# Patient Record
Sex: FEMALE | Race: WHITE | NOT HISPANIC OR LATINO | URBAN - METROPOLITAN AREA
[De-identification: names, ages, dates, MRNs, and addresses within clinical notes are randomized per-mention and may not be internally consistent; named-entity substitution may affect disease eponyms.]

---

## 2017-10-31 ENCOUNTER — EMERGENCY (EMERGENCY)
Facility: HOSPITAL | Age: 82
LOS: 1 days | Discharge: HOME | End: 2017-10-31
Attending: EMERGENCY MEDICINE | Admitting: EMERGENCY MEDICINE
Payer: MEDICARE

## 2017-10-31 VITALS
DIASTOLIC BLOOD PRESSURE: 58 MMHG | HEART RATE: 83 BPM | RESPIRATION RATE: 16 BRPM | TEMPERATURE: 98 F | SYSTOLIC BLOOD PRESSURE: 154 MMHG | OXYGEN SATURATION: 97 %

## 2017-10-31 VITALS
SYSTOLIC BLOOD PRESSURE: 140 MMHG | TEMPERATURE: 98 F | RESPIRATION RATE: 18 BRPM | HEART RATE: 80 BPM | OXYGEN SATURATION: 99 % | DIASTOLIC BLOOD PRESSURE: 70 MMHG

## 2017-10-31 LAB
ALBUMIN SERPL ELPH-MCNC: 4.3 G/DL — SIGNIFICANT CHANGE UP (ref 3.3–5)
ALP SERPL-CCNC: 110 U/L — SIGNIFICANT CHANGE UP (ref 40–120)
ALT FLD-CCNC: 15 U/L — SIGNIFICANT CHANGE UP (ref 4–33)
APPEARANCE UR: CLEAR — SIGNIFICANT CHANGE UP
AST SERPL-CCNC: 19 U/L — SIGNIFICANT CHANGE UP (ref 4–32)
BACTERIA # UR AUTO: SIGNIFICANT CHANGE UP
BASOPHILS # BLD AUTO: 0.03 K/UL — SIGNIFICANT CHANGE UP (ref 0–0.2)
BASOPHILS NFR BLD AUTO: 0.2 % — SIGNIFICANT CHANGE UP (ref 0–2)
BILIRUB SERPL-MCNC: 2.2 MG/DL — HIGH (ref 0.2–1.2)
BILIRUB UR-MCNC: NEGATIVE — SIGNIFICANT CHANGE UP
BLOOD UR QL VISUAL: NEGATIVE — SIGNIFICANT CHANGE UP
BUN SERPL-MCNC: 13 MG/DL — SIGNIFICANT CHANGE UP (ref 7–23)
CALCIUM SERPL-MCNC: 9.4 MG/DL — SIGNIFICANT CHANGE UP (ref 8.4–10.5)
CHLORIDE SERPL-SCNC: 98 MMOL/L — SIGNIFICANT CHANGE UP (ref 98–107)
CK MB BLD-MCNC: 2.8 NG/ML — SIGNIFICANT CHANGE UP (ref 1–4.7)
CK SERPL-CCNC: 157 U/L — SIGNIFICANT CHANGE UP (ref 25–170)
CO2 SERPL-SCNC: 25 MMOL/L — SIGNIFICANT CHANGE UP (ref 22–31)
COLOR SPEC: YELLOW — SIGNIFICANT CHANGE UP
CREAT SERPL-MCNC: 0.69 MG/DL — SIGNIFICANT CHANGE UP (ref 0.5–1.3)
EOSINOPHIL # BLD AUTO: 0.01 K/UL — SIGNIFICANT CHANGE UP (ref 0–0.5)
EOSINOPHIL NFR BLD AUTO: 0.1 % — SIGNIFICANT CHANGE UP (ref 0–6)
GLUCOSE SERPL-MCNC: 119 MG/DL — HIGH (ref 70–99)
GLUCOSE UR-MCNC: NEGATIVE — SIGNIFICANT CHANGE UP
HCT VFR BLD CALC: 40.5 % — SIGNIFICANT CHANGE UP (ref 34.5–45)
HGB BLD-MCNC: 13 G/DL — SIGNIFICANT CHANGE UP (ref 11.5–15.5)
HYALINE CASTS # UR AUTO: SIGNIFICANT CHANGE UP (ref 0–?)
IMM GRANULOCYTES # BLD AUTO: 0.05 # — SIGNIFICANT CHANGE UP
IMM GRANULOCYTES NFR BLD AUTO: 0.3 % — SIGNIFICANT CHANGE UP (ref 0–1.5)
KETONES UR-MCNC: SIGNIFICANT CHANGE UP
LEUKOCYTE ESTERASE UR-ACNC: SIGNIFICANT CHANGE UP
LYMPHOCYTES # BLD AUTO: 1.81 K/UL — SIGNIFICANT CHANGE UP (ref 1–3.3)
LYMPHOCYTES # BLD AUTO: 11.9 % — LOW (ref 13–44)
MCHC RBC-ENTMCNC: 27.8 PG — SIGNIFICANT CHANGE UP (ref 27–34)
MCHC RBC-ENTMCNC: 32.1 % — SIGNIFICANT CHANGE UP (ref 32–36)
MCV RBC AUTO: 86.7 FL — SIGNIFICANT CHANGE UP (ref 80–100)
MONOCYTES # BLD AUTO: 1.1 K/UL — HIGH (ref 0–0.9)
MONOCYTES NFR BLD AUTO: 7.2 % — SIGNIFICANT CHANGE UP (ref 2–14)
MUCOUS THREADS # UR AUTO: SIGNIFICANT CHANGE UP
NEUTROPHILS # BLD AUTO: 12.18 K/UL — HIGH (ref 1.8–7.4)
NEUTROPHILS NFR BLD AUTO: 80.3 % — HIGH (ref 43–77)
NITRITE UR-MCNC: NEGATIVE — SIGNIFICANT CHANGE UP
NRBC # FLD: 0 — SIGNIFICANT CHANGE UP
PH UR: 7 — SIGNIFICANT CHANGE UP (ref 4.6–8)
PLATELET # BLD AUTO: 412 K/UL — HIGH (ref 150–400)
PMV BLD: 10.2 FL — SIGNIFICANT CHANGE UP (ref 7–13)
POTASSIUM SERPL-MCNC: 4.5 MMOL/L — SIGNIFICANT CHANGE UP (ref 3.5–5.3)
POTASSIUM SERPL-SCNC: 4.5 MMOL/L — SIGNIFICANT CHANGE UP (ref 3.5–5.3)
PROT SERPL-MCNC: 6.9 G/DL — SIGNIFICANT CHANGE UP (ref 6–8.3)
PROT UR-MCNC: NEGATIVE — SIGNIFICANT CHANGE UP
RBC # BLD: 4.67 M/UL — SIGNIFICANT CHANGE UP (ref 3.8–5.2)
RBC # FLD: 15 % — HIGH (ref 10.3–14.5)
RBC CASTS # UR COMP ASSIST: SIGNIFICANT CHANGE UP (ref 0–?)
SODIUM SERPL-SCNC: 138 MMOL/L — SIGNIFICANT CHANGE UP (ref 135–145)
SP GR SPEC: 1.02 — SIGNIFICANT CHANGE UP (ref 1–1.03)
TROPONIN T SERPL-MCNC: < 0.06 NG/ML — SIGNIFICANT CHANGE UP (ref 0–0.06)
TROPONIN T SERPL-MCNC: < 0.06 NG/ML — SIGNIFICANT CHANGE UP (ref 0–0.06)
TSH SERPL-MCNC: 0.82 UIU/ML — SIGNIFICANT CHANGE UP (ref 0.27–4.2)
UROBILINOGEN FLD QL: NORMAL E.U. — SIGNIFICANT CHANGE UP (ref 0.1–0.2)
WBC # BLD: 15.18 K/UL — HIGH (ref 3.8–10.5)
WBC # FLD AUTO: 15.18 K/UL — HIGH (ref 3.8–10.5)
WBC UR QL: SIGNIFICANT CHANGE UP (ref 0–?)

## 2017-10-31 PROCEDURE — 70450 CT HEAD/BRAIN W/O DYE: CPT | Mod: 26

## 2017-10-31 PROCEDURE — 71020: CPT | Mod: 26

## 2017-10-31 PROCEDURE — 72125 CT NECK SPINE W/O DYE: CPT | Mod: 26

## 2017-10-31 PROCEDURE — 99285 EMERGENCY DEPT VISIT HI MDM: CPT | Mod: 25,GC

## 2017-10-31 PROCEDURE — 72170 X-RAY EXAM OF PELVIS: CPT | Mod: 26

## 2017-10-31 PROCEDURE — 12002 RPR S/N/AX/GEN/TRNK2.6-7.5CM: CPT

## 2017-10-31 RX ORDER — SODIUM CHLORIDE 9 MG/ML
500 INJECTION INTRAMUSCULAR; INTRAVENOUS; SUBCUTANEOUS ONCE
Qty: 0 | Refills: 0 | Status: COMPLETED | OUTPATIENT
Start: 2017-10-31 | End: 2017-10-31

## 2017-10-31 RX ADMIN — SODIUM CHLORIDE 500 MILLILITER(S): 9 INJECTION INTRAMUSCULAR; INTRAVENOUS; SUBCUTANEOUS at 19:33

## 2017-10-31 NOTE — ED PROVIDER NOTE - PROGRESS NOTE DETAILS
RHETT Garcia: Pt signed out to me by , waiting on UA results and second set of cardiac enzymes. Rubi: spoke with family extensively and really wants to take pt home and not to admit pt.  straight cath drain 1.2 L dark urine.  ua sent.  ekg pending and trop x 2.  pt with wbc 15. otherwise 1st trop neg.  ct head and cervical neg.  s/o to Dr Whelan to f/u on ua and stool studies- offer admission.  pt had multiple bouts of watery diarrhea. abd exam s/nt/nd.    Dx mechanical fall vs infectious cause of fall. RHETT Garcia: Discussed negative second troponin and UA with pt and family. Discussed sending pt home with a arevalo and urology follow up as over 1L urine was collected following straight cath. Pt and family refuse to have pt go home with arevalo as she had never had urinary issues in the past. Discussed risks of urinary retention including infection, backup into kindeys with resultant decrease in kidney function. Pt and family still refusing to go home with arevalo. RHETT Garcia: Discussed negative second troponin and UA with pt and family. Discussed sending pt home with a arevalo and urology follow up as over 1L urine was collected following straight cath and we have a concern for urinary retention. Pt and family refuse to have pt go home with arevalo as she had never had urinary issues in the past. Discussed risks of urinary retention including infection, backup into kidneys which can result in decreased in kidney function and illness. Pt and family still refusing to go home with Arevalo. They have good PMD follow up with Dr.Norma Suarez

## 2017-10-31 NOTE — ED PROVIDER NOTE - OBJECTIVE STATEMENT
89F with pmhx early dementia, HLD, hypothyroid presents to ED from PMD office, after a reported fall at home last night. Hx obtained from patient, and son/daughter. Patient reportedly fell at home last night when walking to bathroom. Patient denies any dizziness, lightheadedness, chest pain, or shortness of breath preceding the event, but cannot clearly state what caused her to fall. Patient reports hitting her head and cutting the back of her head, denies any LOC but is not 100% certain. Patient reports feeling somewhat off balance after she fell. Patient was helped into bed by , went to bed, and went to see PMD this morning. Patient currently endorses headache but denies any fevers or chills, nausea or vomiting, chest pain, shortness of breath, abdominal pain, diarrhea or constipation, dysuria, hematuria.  Meds include synthroid, lipitor. No allergies.

## 2017-10-31 NOTE — ED PROVIDER NOTE - CARE PLAN
Instructions for follow-up, activity and diet:	Follow up with your primary care provider within 48 hours  Rest and drink plenty of fluids  Apply bacitracin to staples  Return to the emergency department with any worsening or concerning symptoms, another fall, dysuria, distended abdomin, new onset fever or any other concerns. Principal Discharge DX:	Scalp laceration, initial encounter  Instructions for follow-up, activity and diet:	Follow up with your primary care provider within 48 hours  Rest and drink plenty of fluids  Apply bacitracin to staples  Return to the emergency department with any worsening or concerning symptoms, another fall, dysuria, distended abdomin, new onset fever or any other concerns.  Secondary Diagnosis:	Urinary retention

## 2017-10-31 NOTE — ED ADULT NURSE NOTE - OBJECTIVE STATEMENT
Rec'd in IN 2. CC fall with laceration to posterior aspect of head. Endorses complaint of diarrhea. Watery. Pt had several bowel movements. Aox3. No acute distress. 1200 ml output from straight cath. Dr. Rubi aware. Family at bedside.

## 2017-10-31 NOTE — ED ADULT NURSE REASSESSMENT NOTE - NS ED NURSE REASSESS COMMENT FT1
report received at shift change, pt in intake rm 2, AAOx3, VS as noted, pt in NAD, rpt trop drawn and sent, rpt EKG in progress, pt family remains at bedside, pt awaiting lab results. will continue to monitor pt.

## 2017-10-31 NOTE — ED PROVIDER NOTE - PHYSICAL EXAMINATION
wound to back of head, will explore and clean to determine need for closure  normal neuro exam, no deficits noted

## 2017-10-31 NOTE — ED ADULT TRIAGE NOTE - CHIEF COMPLAINT QUOTE
Patient brought to ER from home by EMS. Pt felll last night as per  attempting to walk to BR and sustained a laceration to back of head. Sent to ER by PMD for CT Scan and sutures.

## 2017-10-31 NOTE — ED PROVIDER NOTE - MEDICAL DECISION MAKING DETAILS
89F with pmhx early dementia, HLD, hypothyroid presents to ED from PMD office, after a reported fall at home last night. Presents from PMD. Concern for syncope vs mechanical fall. Will obtain: EKG, CT head/c-spine, CXR, pelvis xray, cbc, cmp, ua, ucx, tsh, enzymes. Will explore head wound and assess need for primary closure. Currently stable.   John Vásquez MD, PGY1

## 2017-10-31 NOTE — ED PROVIDER NOTE - PLAN OF CARE
Follow up with your primary care provider within 48 hours  Rest and drink plenty of fluids  Apply bacitracin to staples  Return to the emergency department with any worsening or concerning symptoms, another fall, dysuria, distended abdomin, new onset fever or any other concerns.

## 2017-10-31 NOTE — ED PROVIDER NOTE - NEUROLOGICAL, MLM
Alert and oriented, no focal deficits noted Alert and oriented, no focal deficits noted, ambulating at baseline

## 2017-11-02 LAB
BACTERIA UR CULT: SIGNIFICANT CHANGE UP
SPECIMEN SOURCE: SIGNIFICANT CHANGE UP

## 2017-11-14 ENCOUNTER — APPOINTMENT (OUTPATIENT)
Dept: GASTROENTEROLOGY | Facility: CLINIC | Age: 82
End: 2017-11-14

## 2017-12-06 ENCOUNTER — APPOINTMENT (OUTPATIENT)
Dept: GASTROENTEROLOGY | Facility: CLINIC | Age: 82
End: 2017-12-06
Payer: MEDICARE

## 2017-12-06 VITALS
DIASTOLIC BLOOD PRESSURE: 80 MMHG | WEIGHT: 123 LBS | HEIGHT: 62 IN | BODY MASS INDEX: 22.63 KG/M2 | SYSTOLIC BLOOD PRESSURE: 120 MMHG | TEMPERATURE: 97.7 F

## 2017-12-06 PROCEDURE — 99214 OFFICE O/P EST MOD 30 MIN: CPT

## 2017-12-06 RX ORDER — LATANOPROST/PF 0.005 %
0.01 DROPS OPHTHALMIC (EYE)
Refills: 0 | Status: ACTIVE | COMMUNITY

## 2017-12-06 RX ORDER — VITS A,C,E/LUTEIN/MINERALS 300MCG-200
TABLET ORAL
Refills: 0 | Status: ACTIVE | COMMUNITY

## 2017-12-08 ENCOUNTER — APPOINTMENT (OUTPATIENT)
Dept: GASTROENTEROLOGY | Facility: CLINIC | Age: 82
End: 2017-12-08

## 2017-12-20 ENCOUNTER — APPOINTMENT (OUTPATIENT)
Dept: GASTROENTEROLOGY | Facility: CLINIC | Age: 82
End: 2017-12-20
Payer: MEDICARE

## 2017-12-20 VITALS
HEIGHT: 62 IN | WEIGHT: 123 LBS | BODY MASS INDEX: 22.63 KG/M2 | SYSTOLIC BLOOD PRESSURE: 130 MMHG | DIASTOLIC BLOOD PRESSURE: 70 MMHG | TEMPERATURE: 97.7 F

## 2017-12-20 DIAGNOSIS — R10.9 UNSPECIFIED ABDOMINAL PAIN: ICD-10-CM

## 2017-12-20 PROCEDURE — 99214 OFFICE O/P EST MOD 30 MIN: CPT

## 2018-01-27 DIAGNOSIS — K59.00 CONSTIPATION, UNSPECIFIED: ICD-10-CM

## 2018-02-20 ENCOUNTER — APPOINTMENT (OUTPATIENT)
Dept: GASTROENTEROLOGY | Facility: CLINIC | Age: 83
End: 2018-02-20

## 2018-03-29 ENCOUNTER — APPOINTMENT (OUTPATIENT)
Dept: GASTROENTEROLOGY | Facility: CLINIC | Age: 83
End: 2018-03-29

## 2018-06-14 ENCOUNTER — RX RENEWAL (OUTPATIENT)
Age: 83
End: 2018-06-14

## 2018-11-15 PROBLEM — E03.9 HYPOTHYROIDISM, UNSPECIFIED: Chronic | Status: ACTIVE | Noted: 2017-10-31

## 2018-12-03 ENCOUNTER — APPOINTMENT (OUTPATIENT)
Dept: GASTROENTEROLOGY | Facility: CLINIC | Age: 83
End: 2018-12-03
Payer: MEDICARE

## 2018-12-03 VITALS
BODY MASS INDEX: 25.76 KG/M2 | HEIGHT: 62 IN | DIASTOLIC BLOOD PRESSURE: 70 MMHG | TEMPERATURE: 98 F | WEIGHT: 140 LBS | HEART RATE: 68 BPM | OXYGEN SATURATION: 98 % | SYSTOLIC BLOOD PRESSURE: 120 MMHG

## 2018-12-03 DIAGNOSIS — Z78.9 OTHER SPECIFIED HEALTH STATUS: ICD-10-CM

## 2018-12-03 DIAGNOSIS — Z86.69 PERSONAL HISTORY OF OTHER DISEASES OF THE NERVOUS SYSTEM AND SENSE ORGANS: ICD-10-CM

## 2018-12-03 DIAGNOSIS — R94.5 ABNORMAL RESULTS OF LIVER FUNCTION STUDIES: ICD-10-CM

## 2018-12-03 PROCEDURE — 99214 OFFICE O/P EST MOD 30 MIN: CPT

## 2018-12-03 RX ORDER — LUBIPROSTONE 24 UG/1
24 CAPSULE, GELATIN COATED ORAL TWICE DAILY
Qty: 180 | Refills: 3 | Status: DISCONTINUED | COMMUNITY
Start: 2017-12-06 | End: 2018-12-03

## 2019-01-17 ENCOUNTER — EMERGENCY (EMERGENCY)
Facility: HOSPITAL | Age: 84
LOS: 0 days | Discharge: HOME | End: 2019-01-17
Attending: EMERGENCY MEDICINE
Payer: MEDICARE

## 2019-01-17 VITALS
RESPIRATION RATE: 17 BRPM | OXYGEN SATURATION: 98 % | TEMPERATURE: 98 F | DIASTOLIC BLOOD PRESSURE: 76 MMHG | SYSTOLIC BLOOD PRESSURE: 142 MMHG | HEART RATE: 81 BPM

## 2019-01-17 VITALS
RESPIRATION RATE: 15 BRPM | OXYGEN SATURATION: 98 % | DIASTOLIC BLOOD PRESSURE: 75 MMHG | TEMPERATURE: 97 F | HEART RATE: 77 BPM | HEIGHT: 62 IN | SYSTOLIC BLOOD PRESSURE: 147 MMHG | WEIGHT: 139.99 LBS

## 2019-01-17 DIAGNOSIS — E78.5 HYPERLIPIDEMIA, UNSPECIFIED: ICD-10-CM

## 2019-01-17 DIAGNOSIS — R40.4 TRANSIENT ALTERATION OF AWARENESS: ICD-10-CM

## 2019-01-17 DIAGNOSIS — Z79.82 LONG TERM (CURRENT) USE OF ASPIRIN: ICD-10-CM

## 2019-01-17 DIAGNOSIS — F03.90 UNSPECIFIED DEMENTIA WITHOUT BEHAVIORAL DISTURBANCE: ICD-10-CM

## 2019-01-17 DIAGNOSIS — E03.9 HYPOTHYROIDISM, UNSPECIFIED: ICD-10-CM

## 2019-01-17 DIAGNOSIS — Z79.899 OTHER LONG TERM (CURRENT) DRUG THERAPY: ICD-10-CM

## 2019-01-17 DIAGNOSIS — R41.82 ALTERED MENTAL STATUS, UNSPECIFIED: ICD-10-CM

## 2019-01-17 DIAGNOSIS — I50.9 HEART FAILURE, UNSPECIFIED: ICD-10-CM

## 2019-01-17 LAB
ALBUMIN SERPL ELPH-MCNC: 3.6 G/DL — SIGNIFICANT CHANGE UP (ref 3.3–5)
ALP SERPL-CCNC: 285 U/L — HIGH (ref 40–120)
ALT FLD-CCNC: 58 U/L — SIGNIFICANT CHANGE UP (ref 12–78)
ANION GAP SERPL CALC-SCNC: 6 MMOL/L — SIGNIFICANT CHANGE UP (ref 5–17)
APPEARANCE UR: CLEAR — SIGNIFICANT CHANGE UP
AST SERPL-CCNC: 41 U/L — HIGH (ref 15–37)
BACTERIA # UR AUTO: ABNORMAL
BASOPHILS # BLD AUTO: 0.05 K/UL — SIGNIFICANT CHANGE UP (ref 0–0.2)
BASOPHILS NFR BLD AUTO: 0.6 % — SIGNIFICANT CHANGE UP (ref 0–2)
BILIRUB SERPL-MCNC: 1.4 MG/DL — HIGH (ref 0.2–1.2)
BILIRUB UR-MCNC: NEGATIVE — SIGNIFICANT CHANGE UP
BUN SERPL-MCNC: 15 MG/DL — SIGNIFICANT CHANGE UP (ref 7–23)
CALCIUM SERPL-MCNC: 9.1 MG/DL — SIGNIFICANT CHANGE UP (ref 8.5–10.1)
CHLORIDE SERPL-SCNC: 107 MMOL/L — SIGNIFICANT CHANGE UP (ref 96–108)
CO2 SERPL-SCNC: 27 MMOL/L — SIGNIFICANT CHANGE UP (ref 22–31)
COLOR SPEC: YELLOW — SIGNIFICANT CHANGE UP
CREAT SERPL-MCNC: 0.78 MG/DL — SIGNIFICANT CHANGE UP (ref 0.5–1.3)
DIFF PNL FLD: NEGATIVE — SIGNIFICANT CHANGE UP
EOSINOPHIL # BLD AUTO: 0.33 K/UL — SIGNIFICANT CHANGE UP (ref 0–0.5)
EOSINOPHIL NFR BLD AUTO: 4.3 % — SIGNIFICANT CHANGE UP (ref 0–6)
GLUCOSE BLDC GLUCOMTR-MCNC: 93 MG/DL — SIGNIFICANT CHANGE UP (ref 70–99)
GLUCOSE SERPL-MCNC: 99 MG/DL — SIGNIFICANT CHANGE UP (ref 70–99)
GLUCOSE UR QL: NEGATIVE MG/DL — SIGNIFICANT CHANGE UP
HCT VFR BLD CALC: 40.5 % — SIGNIFICANT CHANGE UP (ref 34.5–45)
HGB BLD-MCNC: 12.9 G/DL — SIGNIFICANT CHANGE UP (ref 11.5–15.5)
IMM GRANULOCYTES NFR BLD AUTO: 0.1 % — SIGNIFICANT CHANGE UP (ref 0–1.5)
KETONES UR-MCNC: NEGATIVE — SIGNIFICANT CHANGE UP
LEUKOCYTE ESTERASE UR-ACNC: ABNORMAL
LIDOCAIN IGE QN: 96 U/L — SIGNIFICANT CHANGE UP (ref 73–393)
LYMPHOCYTES # BLD AUTO: 2.46 K/UL — SIGNIFICANT CHANGE UP (ref 1–3.3)
LYMPHOCYTES # BLD AUTO: 31.7 % — SIGNIFICANT CHANGE UP (ref 13–44)
MCHC RBC-ENTMCNC: 27.9 PG — SIGNIFICANT CHANGE UP (ref 27–34)
MCHC RBC-ENTMCNC: 31.9 GM/DL — LOW (ref 32–36)
MCV RBC AUTO: 87.5 FL — SIGNIFICANT CHANGE UP (ref 80–100)
MONOCYTES # BLD AUTO: 0.59 K/UL — SIGNIFICANT CHANGE UP (ref 0–0.9)
MONOCYTES NFR BLD AUTO: 7.6 % — SIGNIFICANT CHANGE UP (ref 2–14)
NEUTROPHILS # BLD AUTO: 4.31 K/UL — SIGNIFICANT CHANGE UP (ref 1.8–7.4)
NEUTROPHILS NFR BLD AUTO: 55.7 % — SIGNIFICANT CHANGE UP (ref 43–77)
NITRITE UR-MCNC: NEGATIVE — SIGNIFICANT CHANGE UP
NRBC # BLD: 0 /100 WBCS — SIGNIFICANT CHANGE UP (ref 0–0)
NT-PROBNP SERPL-SCNC: 52 PG/ML — SIGNIFICANT CHANGE UP (ref 0–450)
PH UR: 6 — SIGNIFICANT CHANGE UP (ref 5–8)
PLATELET # BLD AUTO: 467 K/UL — HIGH (ref 150–400)
POTASSIUM SERPL-MCNC: 4 MMOL/L — SIGNIFICANT CHANGE UP (ref 3.5–5.3)
POTASSIUM SERPL-SCNC: 4 MMOL/L — SIGNIFICANT CHANGE UP (ref 3.5–5.3)
PROT SERPL-MCNC: 7.7 GM/DL — SIGNIFICANT CHANGE UP (ref 6–8.3)
PROT UR-MCNC: NEGATIVE MG/DL — SIGNIFICANT CHANGE UP
RBC # BLD: 4.63 M/UL — SIGNIFICANT CHANGE UP (ref 3.8–5.2)
RBC # FLD: 15.7 % — HIGH (ref 10.3–14.5)
SODIUM SERPL-SCNC: 140 MMOL/L — SIGNIFICANT CHANGE UP (ref 135–145)
SP GR SPEC: 1.01 — SIGNIFICANT CHANGE UP (ref 1.01–1.02)
UROBILINOGEN FLD QL: NEGATIVE MG/DL — SIGNIFICANT CHANGE UP
WBC # BLD: 7.75 K/UL — SIGNIFICANT CHANGE UP (ref 3.8–10.5)
WBC # FLD AUTO: 7.75 K/UL — SIGNIFICANT CHANGE UP (ref 3.8–10.5)
WBC UR QL: SIGNIFICANT CHANGE UP

## 2019-01-17 PROCEDURE — 99284 EMERGENCY DEPT VISIT MOD MDM: CPT

## 2019-01-17 PROCEDURE — 93010 ELECTROCARDIOGRAM REPORT: CPT

## 2019-01-17 PROCEDURE — 71045 X-RAY EXAM CHEST 1 VIEW: CPT | Mod: 26

## 2019-01-17 PROCEDURE — 70450 CT HEAD/BRAIN W/O DYE: CPT | Mod: 26

## 2019-01-17 NOTE — ED ADULT TRIAGE NOTE - CHIEF COMPLAINT QUOTE
altered mental status unresponsive family unable to wake her up. Upon EMS arrival pt awake and responsive. Pt feels warm. Pt had breakfast, took a nap that is when family had trouble waking her.

## 2019-01-17 NOTE — ED PROVIDER NOTE - PROGRESS NOTE DETAILS
Pt has been active and alert during ED stay. Labs wnl, has some elevated lft. Pt has been evaluated for this, has fatty liver. Pt eager for discharge with family, will f/u w pmd.

## 2019-01-17 NOTE — ED PROVIDER NOTE - MEDICAL DECISION MAKING DETAILS
Ddx: brief AMS, ro occult infection/ no focal symptoms to suggest tia or CVa  Plan: Cbc, cmp, lipase, ua, cxr, CT head, reassess

## 2019-01-17 NOTE — ED ADULT NURSE NOTE - INTERVENTIONS DEFINITIONS
Stretcher in lowest position, wheels locked, appropriate side rails in place/Instruct patient to call for assistance

## 2019-01-18 LAB
CULTURE RESULTS: NO GROWTH — SIGNIFICANT CHANGE UP
SPECIMEN SOURCE: SIGNIFICANT CHANGE UP

## 2019-04-04 ENCOUNTER — RX RENEWAL (OUTPATIENT)
Age: 84
End: 2019-04-04

## 2019-04-04 RX ORDER — METHYLNALTREXONE BROMIDE 150 MG/1
150 TABLET ORAL
Qty: 90 | Refills: 2 | Status: ACTIVE | COMMUNITY
Start: 2018-03-21 | End: 1900-01-01

## 2019-05-31 ENCOUNTER — APPOINTMENT (OUTPATIENT)
Dept: GASTROENTEROLOGY | Facility: CLINIC | Age: 84
End: 2019-05-31
Payer: MEDICARE

## 2019-05-31 VITALS
DIASTOLIC BLOOD PRESSURE: 75 MMHG | SYSTOLIC BLOOD PRESSURE: 130 MMHG | HEART RATE: 100 BPM | BODY MASS INDEX: 27.23 KG/M2 | RESPIRATION RATE: 16 BRPM | HEIGHT: 62 IN | TEMPERATURE: 97.4 F | OXYGEN SATURATION: 94 % | WEIGHT: 148 LBS

## 2019-05-31 DIAGNOSIS — D64.9 ANEMIA, UNSPECIFIED: ICD-10-CM

## 2019-05-31 DIAGNOSIS — K58.1 IRRITABLE BOWEL SYNDROME WITH CONSTIPATION: ICD-10-CM

## 2019-05-31 PROCEDURE — 99214 OFFICE O/P EST MOD 30 MIN: CPT

## 2019-05-31 RX ORDER — CLONAZEPAM 0.5 MG/1
0.5 TABLET ORAL
Qty: 60 | Refills: 0 | Status: ACTIVE | COMMUNITY
Start: 2019-01-11

## 2019-05-31 RX ORDER — QUETIAPINE FUMARATE 25 MG/1
25 TABLET ORAL
Qty: 120 | Refills: 0 | Status: ACTIVE | COMMUNITY
Start: 2019-05-15

## 2019-05-31 RX ORDER — NITROFURANTOIN (MONOHYDRATE/MACROCRYSTALS) 25; 75 MG/1; MG/1
100 CAPSULE ORAL
Qty: 10 | Refills: 0 | Status: ACTIVE | COMMUNITY
Start: 2019-05-28

## 2019-05-31 RX ORDER — TEMAZEPAM 15 MG/1
15 CAPSULE ORAL
Qty: 30 | Refills: 0 | Status: ACTIVE | COMMUNITY
Start: 2019-05-15

## 2019-05-31 RX ORDER — IRON POLYSACCHARIDE COMPLEX 150 MG
150 CAPSULE ORAL
Qty: 30 | Refills: 3 | Status: ACTIVE | COMMUNITY
Start: 2019-05-31 | End: 1900-01-01

## 2019-05-31 RX ORDER — LACTULOSE 10 G/15ML
10 SOLUTION ORAL
Qty: 473 | Refills: 0 | Status: ACTIVE | COMMUNITY
Start: 2019-04-11

## 2019-05-31 RX ORDER — ZOLPIDEM TARTRATE 5 MG/1
5 TABLET ORAL
Qty: 30 | Refills: 0 | Status: ACTIVE | COMMUNITY
Start: 2019-05-28

## 2019-05-31 NOTE — HISTORY OF PRESENT ILLNESS
[FreeTextEntry1] : Patient is a 89-year-old female with history of chronic constipation. She takes her last her on MiraLax on a p.r.n. basis.\par Her transaminases have improved. Alkaline phosphatase is slightly elevated.\par She was referred for mild anemia and possible iron deficiency. H./H. is 10.7/33.4, MCV 83, ferritin 48% saturation 4.9%, iron 15, TIBC 305.\par No evidence of melena or BRB.

## 2019-05-31 NOTE — ASSESSMENT
[FreeTextEntry1] : Patient with mild anemia which has been stable. Iron studies are borderline for iron deficiency or more likely due to chronic disease. FOBT will be ordered to see if she has any occult GI bleeding. Iron supplements will also be ordered.

## 2019-06-04 ENCOUNTER — APPOINTMENT (OUTPATIENT)
Dept: GASTROENTEROLOGY | Facility: CLINIC | Age: 84
End: 2019-06-04

## 2022-03-30 RX ORDER — CEFUROXIME AXETIL 250 MG
1 TABLET ORAL
Qty: 0 | Refills: 0 | DISCHARGE
Start: 2022-03-30

## 2022-04-01 ENCOUNTER — EMERGENCY (EMERGENCY)
Facility: HOSPITAL | Age: 87
LOS: 0 days | Discharge: ANOTHER HOSPITAL | End: 2022-04-01
Attending: STUDENT IN AN ORGANIZED HEALTH CARE EDUCATION/TRAINING PROGRAM
Payer: MEDICARE

## 2022-04-01 ENCOUNTER — INPATIENT (INPATIENT)
Facility: HOSPITAL | Age: 87
LOS: 5 days | Discharge: HOSPICE | End: 2022-04-07
Attending: HOSPITALIST | Admitting: HOSPITALIST
Payer: MEDICARE

## 2022-04-01 VITALS
TEMPERATURE: 99 F | HEART RATE: 59 BPM | OXYGEN SATURATION: 93 % | RESPIRATION RATE: 16 BRPM | WEIGHT: 130.07 LBS | DIASTOLIC BLOOD PRESSURE: 75 MMHG | HEIGHT: 62 IN | SYSTOLIC BLOOD PRESSURE: 114 MMHG

## 2022-04-01 VITALS
TEMPERATURE: 97 F | RESPIRATION RATE: 16 BRPM | DIASTOLIC BLOOD PRESSURE: 71 MMHG | HEART RATE: 95 BPM | HEIGHT: 62 IN | SYSTOLIC BLOOD PRESSURE: 105 MMHG | OXYGEN SATURATION: 96 %

## 2022-04-01 DIAGNOSIS — R20.9 UNSPECIFIED DISTURBANCES OF SKIN SENSATION: ICD-10-CM

## 2022-04-01 DIAGNOSIS — E03.9 HYPOTHYROIDISM, UNSPECIFIED: ICD-10-CM

## 2022-04-01 DIAGNOSIS — E78.5 HYPERLIPIDEMIA, UNSPECIFIED: ICD-10-CM

## 2022-04-01 DIAGNOSIS — R41.82 ALTERED MENTAL STATUS, UNSPECIFIED: ICD-10-CM

## 2022-04-01 DIAGNOSIS — F03.90 UNSPECIFIED DEMENTIA, UNSPECIFIED SEVERITY, WITHOUT BEHAVIORAL DISTURBANCE, PSYCHOTIC DISTURBANCE, MOOD DISTURBANCE, AND ANXIETY: ICD-10-CM

## 2022-04-01 DIAGNOSIS — F03.90 UNSPECIFIED DEMENTIA WITHOUT BEHAVIORAL DISTURBANCE: ICD-10-CM

## 2022-04-01 DIAGNOSIS — G93.40 ENCEPHALOPATHY, UNSPECIFIED: ICD-10-CM

## 2022-04-01 DIAGNOSIS — I70.222 ATHEROSCLEROSIS OF NATIVE ARTERIES OF EXTREMITIES WITH REST PAIN, LEFT LEG: ICD-10-CM

## 2022-04-01 DIAGNOSIS — Z79.82 LONG TERM (CURRENT) USE OF ASPIRIN: ICD-10-CM

## 2022-04-01 DIAGNOSIS — R19.7 DIARRHEA, UNSPECIFIED: ICD-10-CM

## 2022-04-01 DIAGNOSIS — Z20.822 CONTACT WITH AND (SUSPECTED) EXPOSURE TO COVID-19: ICD-10-CM

## 2022-04-01 DIAGNOSIS — Z29.9 ENCOUNTER FOR PROPHYLACTIC MEASURES, UNSPECIFIED: ICD-10-CM

## 2022-04-01 DIAGNOSIS — N39.0 URINARY TRACT INFECTION, SITE NOT SPECIFIED: ICD-10-CM

## 2022-04-01 DIAGNOSIS — I10 ESSENTIAL (PRIMARY) HYPERTENSION: ICD-10-CM

## 2022-04-01 DIAGNOSIS — M62.82 RHABDOMYOLYSIS: ICD-10-CM

## 2022-04-01 DIAGNOSIS — N17.9 ACUTE KIDNEY FAILURE, UNSPECIFIED: ICD-10-CM

## 2022-04-01 LAB
ALBUMIN SERPL ELPH-MCNC: 2 G/DL — LOW (ref 3.3–5)
ALP SERPL-CCNC: 227 U/L — HIGH (ref 40–120)
ALT FLD-CCNC: 56 U/L — SIGNIFICANT CHANGE UP (ref 12–78)
ANION GAP SERPL CALC-SCNC: 7 MMOL/L — SIGNIFICANT CHANGE UP (ref 5–17)
APTT BLD: 31.3 SEC — SIGNIFICANT CHANGE UP (ref 27.5–35.5)
AST SERPL-CCNC: 122 U/L — HIGH (ref 15–37)
BASOPHILS # BLD AUTO: 0.05 K/UL — SIGNIFICANT CHANGE UP (ref 0–0.2)
BASOPHILS NFR BLD AUTO: 0.3 % — SIGNIFICANT CHANGE UP (ref 0–2)
BILIRUB SERPL-MCNC: 0.8 MG/DL — SIGNIFICANT CHANGE UP (ref 0.2–1.2)
BUN SERPL-MCNC: 54 MG/DL — HIGH (ref 7–23)
CALCIUM SERPL-MCNC: 8 MG/DL — LOW (ref 8.5–10.1)
CHLORIDE SERPL-SCNC: 107 MMOL/L — SIGNIFICANT CHANGE UP (ref 96–108)
CK MB BLD-MCNC: 0.7 % — SIGNIFICANT CHANGE UP (ref 0–3.5)
CK MB CFR SERPL CALC: 28.9 NG/ML — HIGH (ref 0.5–3.6)
CK SERPL-CCNC: 4243 U/L — HIGH (ref 26–192)
CO2 SERPL-SCNC: 28 MMOL/L — SIGNIFICANT CHANGE UP (ref 22–31)
CREAT SERPL-MCNC: 2.34 MG/DL — HIGH (ref 0.5–1.3)
EGFR: 19 ML/MIN/1.73M2 — LOW
EOSINOPHIL # BLD AUTO: 0.01 K/UL — SIGNIFICANT CHANGE UP (ref 0–0.5)
EOSINOPHIL NFR BLD AUTO: 0.1 % — SIGNIFICANT CHANGE UP (ref 0–6)
FLUAV AG NPH QL: SIGNIFICANT CHANGE UP
FLUBV AG NPH QL: SIGNIFICANT CHANGE UP
GLUCOSE SERPL-MCNC: 126 MG/DL — HIGH (ref 70–99)
HCT VFR BLD CALC: 36.8 % — SIGNIFICANT CHANGE UP (ref 34.5–45)
HGB BLD-MCNC: 12.4 G/DL — SIGNIFICANT CHANGE UP (ref 11.5–15.5)
IMM GRANULOCYTES NFR BLD AUTO: 0.8 % — SIGNIFICANT CHANGE UP (ref 0–1.5)
INR BLD: 1.12 RATIO — SIGNIFICANT CHANGE UP (ref 0.88–1.16)
LACTATE SERPL-SCNC: 1.7 MMOL/L — SIGNIFICANT CHANGE UP (ref 0.7–2)
LYMPHOCYTES # BLD AUTO: 0.96 K/UL — LOW (ref 1–3.3)
LYMPHOCYTES # BLD AUTO: 6.1 % — LOW (ref 13–44)
MCHC RBC-ENTMCNC: 28.3 PG — SIGNIFICANT CHANGE UP (ref 27–34)
MCHC RBC-ENTMCNC: 33.7 G/DL — SIGNIFICANT CHANGE UP (ref 32–36)
MCV RBC AUTO: 84 FL — SIGNIFICANT CHANGE UP (ref 80–100)
MONOCYTES # BLD AUTO: 0.97 K/UL — HIGH (ref 0–0.9)
MONOCYTES NFR BLD AUTO: 6.1 % — SIGNIFICANT CHANGE UP (ref 2–14)
NEUTROPHILS # BLD AUTO: 13.67 K/UL — HIGH (ref 1.8–7.4)
NEUTROPHILS NFR BLD AUTO: 86.6 % — HIGH (ref 43–77)
NRBC # BLD: 0 /100 WBCS — SIGNIFICANT CHANGE UP (ref 0–0)
PLATELET # BLD AUTO: 327 K/UL — SIGNIFICANT CHANGE UP (ref 150–400)
POTASSIUM SERPL-MCNC: 4.6 MMOL/L — SIGNIFICANT CHANGE UP (ref 3.5–5.3)
POTASSIUM SERPL-SCNC: 4.6 MMOL/L — SIGNIFICANT CHANGE UP (ref 3.5–5.3)
PROT SERPL-MCNC: 6.8 GM/DL — SIGNIFICANT CHANGE UP (ref 6–8.3)
PROTHROM AB SERPL-ACNC: 13.4 SEC — SIGNIFICANT CHANGE UP (ref 10.5–13.4)
RBC # BLD: 4.38 M/UL — SIGNIFICANT CHANGE UP (ref 3.8–5.2)
RBC # FLD: 15.4 % — HIGH (ref 10.3–14.5)
SARS-COV-2 RNA SPEC QL NAA+PROBE: SIGNIFICANT CHANGE UP
SODIUM SERPL-SCNC: 142 MMOL/L — SIGNIFICANT CHANGE UP (ref 135–145)
TROPONIN I, HIGH SENSITIVITY RESULT: 29.7 NG/L — SIGNIFICANT CHANGE UP
WBC # BLD: 15.78 K/UL — HIGH (ref 3.8–10.5)
WBC # FLD AUTO: 15.78 K/UL — HIGH (ref 3.8–10.5)

## 2022-04-01 PROCEDURE — 70450 CT HEAD/BRAIN W/O DYE: CPT | Mod: 26,MA

## 2022-04-01 PROCEDURE — 99285 EMERGENCY DEPT VISIT HI MDM: CPT

## 2022-04-01 PROCEDURE — 93010 ELECTROCARDIOGRAM REPORT: CPT

## 2022-04-01 PROCEDURE — 74176 CT ABD & PELVIS W/O CONTRAST: CPT | Mod: 26,MA

## 2022-04-01 PROCEDURE — 71045 X-RAY EXAM CHEST 1 VIEW: CPT | Mod: 26

## 2022-04-01 PROCEDURE — 99285 EMERGENCY DEPT VISIT HI MDM: CPT | Mod: GC

## 2022-04-01 PROCEDURE — 93926 LOWER EXTREMITY STUDY: CPT | Mod: 26,LT

## 2022-04-01 PROCEDURE — 93970 EXTREMITY STUDY: CPT | Mod: 26

## 2022-04-01 PROCEDURE — 99222 1ST HOSP IP/OBS MODERATE 55: CPT | Mod: GC

## 2022-04-01 RX ORDER — SODIUM CHLORIDE 9 MG/ML
1000 INJECTION INTRAMUSCULAR; INTRAVENOUS; SUBCUTANEOUS ONCE
Refills: 0 | Status: COMPLETED | OUTPATIENT
Start: 2022-04-01 | End: 2022-04-01

## 2022-04-01 RX ORDER — HEPARIN SODIUM 5000 [USP'U]/ML
4500 INJECTION INTRAVENOUS; SUBCUTANEOUS ONCE
Refills: 0 | Status: DISCONTINUED | OUTPATIENT
Start: 2022-04-01 | End: 2022-04-01

## 2022-04-01 RX ORDER — FUROSEMIDE 40 MG
1 TABLET ORAL
Qty: 0 | Refills: 0 | DISCHARGE

## 2022-04-01 RX ORDER — SODIUM CHLORIDE 9 MG/ML
1000 INJECTION INTRAMUSCULAR; INTRAVENOUS; SUBCUTANEOUS
Refills: 0 | Status: DISCONTINUED | OUTPATIENT
Start: 2022-04-01 | End: 2022-04-02

## 2022-04-01 RX ORDER — CEFTRIAXONE 500 MG/1
1000 INJECTION, POWDER, FOR SOLUTION INTRAMUSCULAR; INTRAVENOUS EVERY 24 HOURS
Refills: 0 | Status: DISCONTINUED | OUTPATIENT
Start: 2022-04-01 | End: 2022-04-02

## 2022-04-01 RX ORDER — METHYLNALTREXONE BROMIDE 12 MG/.6ML
3 INJECTION, SOLUTION SUBCUTANEOUS
Qty: 0 | Refills: 0 | DISCHARGE

## 2022-04-01 RX ORDER — HEPARIN SODIUM 5000 [USP'U]/ML
INJECTION INTRAVENOUS; SUBCUTANEOUS
Qty: 25000 | Refills: 0 | Status: DISCONTINUED | OUTPATIENT
Start: 2022-04-01 | End: 2022-04-01

## 2022-04-01 RX ORDER — PANTOPRAZOLE SODIUM 20 MG/1
40 TABLET, DELAYED RELEASE ORAL DAILY
Refills: 0 | Status: DISCONTINUED | OUTPATIENT
Start: 2022-04-01 | End: 2022-04-07

## 2022-04-01 RX ORDER — LANOLIN ALCOHOL/MO/W.PET/CERES
3 CREAM (GRAM) TOPICAL AT BEDTIME
Refills: 0 | Status: DISCONTINUED | OUTPATIENT
Start: 2022-04-01 | End: 2022-04-01

## 2022-04-01 RX ORDER — TEMAZEPAM 15 MG/1
1 CAPSULE ORAL
Qty: 0 | Refills: 0 | DISCHARGE

## 2022-04-01 RX ORDER — LEVOTHYROXINE SODIUM 125 MCG
1 TABLET ORAL
Qty: 0 | Refills: 0 | DISCHARGE

## 2022-04-01 RX ORDER — ACETAMINOPHEN 500 MG
650 TABLET ORAL EVERY 6 HOURS
Refills: 0 | Status: DISCONTINUED | OUTPATIENT
Start: 2022-04-01 | End: 2022-04-01

## 2022-04-01 RX ORDER — ASPIRIN/CALCIUM CARB/MAGNESIUM 324 MG
1 TABLET ORAL
Qty: 0 | Refills: 0 | DISCHARGE

## 2022-04-01 RX ORDER — HEPARIN SODIUM 5000 [USP'U]/ML
INJECTION INTRAVENOUS; SUBCUTANEOUS
Qty: 25000 | Refills: 0 | Status: DISCONTINUED | OUTPATIENT
Start: 2022-04-01 | End: 2022-04-02

## 2022-04-01 RX ORDER — POLYETHYLENE GLYCOL 3350 17 G/17G
0 POWDER, FOR SOLUTION ORAL
Qty: 0 | Refills: 0 | DISCHARGE

## 2022-04-01 RX ORDER — ONDANSETRON 8 MG/1
4 TABLET, FILM COATED ORAL EVERY 8 HOURS
Refills: 0 | Status: DISCONTINUED | OUTPATIENT
Start: 2022-04-01 | End: 2022-04-07

## 2022-04-01 RX ORDER — HEPARIN SODIUM 5000 [USP'U]/ML
4500 INJECTION INTRAVENOUS; SUBCUTANEOUS EVERY 6 HOURS
Refills: 0 | Status: DISCONTINUED | OUTPATIENT
Start: 2022-04-01 | End: 2022-04-01

## 2022-04-01 RX ORDER — HEPARIN SODIUM 5000 [USP'U]/ML
2000 INJECTION INTRAVENOUS; SUBCUTANEOUS EVERY 6 HOURS
Refills: 0 | Status: DISCONTINUED | OUTPATIENT
Start: 2022-04-01 | End: 2022-04-01

## 2022-04-01 RX ADMIN — SODIUM CHLORIDE 100 MILLILITER(S): 9 INJECTION INTRAMUSCULAR; INTRAVENOUS; SUBCUTANEOUS at 23:59

## 2022-04-01 RX ADMIN — HEPARIN SODIUM 4500 UNIT(S): 5000 INJECTION INTRAVENOUS; SUBCUTANEOUS at 15:03

## 2022-04-01 RX ADMIN — CEFTRIAXONE 100 MILLIGRAM(S): 500 INJECTION, POWDER, FOR SOLUTION INTRAMUSCULAR; INTRAVENOUS at 23:59

## 2022-04-01 RX ADMIN — SODIUM CHLORIDE 1000 MILLILITER(S): 9 INJECTION INTRAMUSCULAR; INTRAVENOUS; SUBCUTANEOUS at 15:12

## 2022-04-01 RX ADMIN — HEPARIN SODIUM 1100 UNIT(S)/HR: 5000 INJECTION INTRAVENOUS; SUBCUTANEOUS at 15:03

## 2022-04-01 RX ADMIN — SODIUM CHLORIDE 1000 MILLILITER(S): 9 INJECTION INTRAMUSCULAR; INTRAVENOUS; SUBCUTANEOUS at 12:53

## 2022-04-01 NOTE — ED PROVIDER NOTE - OBJECTIVE STATEMENT
89 year old female with h/o HLD, thyroid disease and advanced demential presents today biba accompanied with her daughter who states that she has not been herself for the last two days, pts symptoms did start eight days ago, at that time she developed diarrhea which last four days, pt was given Immodium and placed on a diet by her PMD, the diarrhea did resolved, however, now since Monday, urinary odor and discoloration noted, pt was started on cipro on Wednesday for presumed UTI, pt was able to take the pill orally x 1 day, since yesterday pt has had decreased appetite, has had no bowel movement and decreased appetite and lethargic, this morning at 7:30am the pt's aide noticed pt's left lower extremity to be purple in color

## 2022-04-01 NOTE — ED ADULT TRIAGE NOTE - CHIEF COMPLAINT QUOTE
Transfer from [place] for left LE arterial occlusion, foot noted to be discolored and cold to touch, daughter at bedside, hx of dementia, heparin infusion 11units/hr Transfer from [place] for left LE arterial occlusion, foot noted to be discolored and cold to touch, daughter at bedside, hx of dementia, heparin infusing at 11units/hr

## 2022-04-01 NOTE — H&P ADULT - ATTENDING COMMENTS
89F hx of HTN, HLD, Hypothyroidism, dementia presenting from [facility] with acute encephalopathy, found to have acute LLE ischemia, UTI, and rhabdomyolysis. Vascular recs appreciated. C/w broad spectrum abx for now. C/w IVF. DNI/DNR, family wished for palliative consult for further evaluation.

## 2022-04-01 NOTE — H&P ADULT - PROBLEM SELECTOR PLAN 4
likely primarily driven by UTI however will need to monitor for electrolyte disturbances driving acute encephalopathy by rhabdomyolysis from acute limb ischemia  -treat underlying UTI with abx and acute limb ischemia with heparin gtt Due to acute limb ischemia  Uptrending CK   -monitor CK q6h  -monitor bmp q6h  -IVF @100/h  -if worsening renal function in setting of rhabdo will need to consider renal consult if consistent with family wishes. On presentation with 2.34 with prior baseline 0.6-0.8 in 2019  Likely prerenal etiology in setting of poor intake 22 UTI as well as acute limb ischemia causing rhabdo and potential component of post-renal as had been retaining prior to arevalo placement found to have bilateral hydro  -urine lytes and repeat UA with microscopic analysis  -Fluids as above  -Arevalo in palce  -BMP q6h  -CK q6h monitoring for rhabdo  -NS @100 for 24h  -If renal failure continues to worsen will need to contact HCP and inquire if dialysis would be consistent with patient wishes.

## 2022-04-01 NOTE — H&P ADULT - PROBLEM SELECTOR PLAN 6
HOLD home statin. unable to take PO and family does not desire NGT No home anti-hypertensives  continue to monitor VS

## 2022-04-01 NOTE — CONSULT NOTE ADULT - ATTENDING COMMENTS
As above  See my additional input in original consult note
Seen ex'ed w res and fellow in ER ~16:30 and followed until ~ 18:00  Non-communicative, frail.  Hist from daughter.  L LE: Dist ischemia   R LE ok w +DP.      REc:   AC  Skin care/protection    Vasc studies reviewed.    DW'ed daughter and son at length re cond, options, implications, risk to life and limb etc.  Family wishes are for comfort/palliative care only wo any intervention.  Understand implications.    PLease arrange for pall care team eval and mgmt.    REconsult if new vasc concerns arise or if GOC change.

## 2022-04-01 NOTE — CONSULT NOTE ADULT - NSCONSULTADDITIONALINFOA_GEN_ALL_CORE
Fellow Attestation:    89F with L foot color changes since 7am.  New onset.  Per daughter, reportedly ambulatory until 2 weeks ago when suffered from diarrhea and UTI.  Has been immobile since.    No history of Afib or hypercoagulable state.     L foot with purplish discoloration and erythema extending to the knee.  Tender.  Palpable L femoral pulse.  Non palpable pop/pedal pulses.  R foot warm and well perfused with palpable fem/pop/pedal pulses.     Official read of venous and arterial duplex pending.     Strong suspicion of acute limb ischemia.  After thorough discussion with patient son (HCP) and daughter, family wishes to pursue palliative care.    Seen and discussed with attending, Dr. Eli

## 2022-04-01 NOTE — H&P ADULT - PROBLEM SELECTOR PLAN 10
AC heparin gtt  Diet NPO, speech and swallow once safe to swallow  Dispo Pending palliative care consult in AM to guide conversation

## 2022-04-01 NOTE — ED ADULT TRIAGE NOTE - HISTORY OF COVID-19 VACCINATION
[FreeTextEntry3] : I, Dr. Edmar Pagan  have read and attest that all the information, medical decision making and discharge instructions within are true and accurate, I  personally performed the evaluation and management (E/M) services for this new patient.  That E/M includes conducting the initial examination, assessing all conditions, and establishing the plan of care.  Today, my ACP, Emilie Durant PA-C, was here to observe my evaluation and management services for this patient to be followed going forward.\par   Vaccine status unknown

## 2022-04-01 NOTE — CONSULT NOTE ADULT - SUBJECTIVE AND OBJECTIVE BOX
HPI:   89F hx of HTN, HLD, Hyperthyroidism who presents from [facility] with 1 day of progressive L foot redness and now purple discoloration and pain. Pt not responsive at time of evaluation. Per daughter, her brother visited pt this AM and noted left foot mottled (someone visits every day and now noted for the first time). Noted to be in pain and lethargic. She was taken to [facility] VS where no palpable pulses per chart review so patient given heparin bolus -> placed on heparin gtt. There patient Cr 2, cpk 4000s. Trop 20s. Per family, prior to recent UTI pt ambulatory with walker.     In ED pt remains somnolent, HD stable. Pt's HCP expresses that they would prefer minimal intervention at this time and would likely opt for palliative/comfort measures. Family expresses understanding of poor prognosis.       PAST MEDICAL & SURGICAL HISTORY:  Hypothyroid    Dementia    No significant past surgical history        ROS: Negative except for HPI    MEDICATIONS:  Unable to obtain given pt clinical condition     Allergies    No Known Allergies    Intolerances        SOCIAL HISTORY: Unable to obtain given pt mental status     FAMILY HISTORY:  No pertinent hx     Vital Signs Last 24 Hrs  T(C): 36.3 (01 Apr 2022 15:56), Max: 37.2 (01 Apr 2022 12:10)  T(F): 97.4 (01 Apr 2022 15:56), Max: 99 (01 Apr 2022 12:10)  HR: 95 (01 Apr 2022 15:56) (59 - 95)  BP: 105/71 (01 Apr 2022 15:56) (105/71 - 114/75)  BP(mean): --  RR: 16 (01 Apr 2022 15:56) (16 - 16)  SpO2: 96% (01 Apr 2022 15:56) (93% - 96%)    PHYSICAL EXAM:    Constitutional: Somnolent, minimally responsive   HEENT: PERRLA, EOMI  Neck: Supple   Respiratory: Unlabored   Cardiovascular: RRR  Gastrointestinal: soft, NT/ND  Extremities: LLE mottled to mid shin, TTP. Foot warm to touch. No wounds noted. LLE WNL. Unable to obtain motor/sensory exam.   Vascular: RLE 2+ peripheral pulses; LLE 2+fem, no pop/dp/pt   Neurological: A/O x 0      LABS:                        12.4   15.78 )-----------( 327      ( 01 Apr 2022 13:26 )             36.8     04-01    142  |  107  |  54<H>  ----------------------------<  126<H>  4.6   |  28  |  2.34<H>    Ca    8.0<L>      01 Apr 2022 13:26    TPro  6.8  /  Alb  2.0<L>  /  TBili  0.8  /  DBili  x   /  AST  122<H>  /  ALT  56  /  AlkPhos  227<H>  04-01    PT/INR - ( 01 Apr 2022 13:26 )   PT: 13.4 sec;   INR: 1.12 ratio         PTT - ( 01 Apr 2022 13:26 )  PTT:31.3 sec        Assessment and Recommendation:   · Assessment	  89F hx of HTN, HLD, Hyperthyroidism who presents from [facility] with 1 day of progressive L foot redness and now purple discoloration and pain. Pt not responsive at time of evaluation. Per daughter, her brother visited pt this AM and noted left foot mottled (someone visits every day and now noted for the first time). Pt with likely acute limb ischemia.     - HCP understands risks/benefits of possible intervention vs comfort measures  - Family would like to proceed with palliative care  - No surgical intervention at this time  - Care per [facility] discussion   - Discussed with Dr. Sreedhar Raymond PGY3   C Team Surgery   [id number]

## 2022-04-01 NOTE — ED ADULT NURSE NOTE - OBJECTIVE STATEMENT
pt here accompanied by Daughter. as per Daughter, pt has baseline dimentia, left foot discoloration up to half of calf, cold to touch, swelling left calf, diminished pedal pulse on left foot. pt responds to pain only. pt here accompanied by Daughter. as per Daughter, pt has baseline dimentia, left foot discoloration up to half of calf, cold to touch, swelling left calf, diminished pedal pulse on left foot. pt responds to pain only. noted with distended abdomen.

## 2022-04-01 NOTE — ED ADULT TRIAGE NOTE - CHIEF COMPLAINT QUOTE
pt biba form home baseline dementia pt slumped over, warm to touch,  ams, currently being treated  for UTI. as per family new onset left foot discolorations onset yesterday., mental status deteriorating, pt less verbal x 2 days.

## 2022-04-01 NOTE — H&P ADULT - PROBLEM SELECTOR PLAN 3
On presentation with 2.34 with prior baseline 0.6-0.8 in 2019  Likely prerenal etiology in setting of poor intake 22 UTI as well as acute limb ischemia causing rhabdo and potential component of post-renal as had been retaining prior to arevalo placement found to have bilateral hydro  -urine lytes and repeat UA with microscopic analysis  -Fluids as above  -Arevalo in palce  -BMP q12h  -NS @100 for 24h  -If renal failure continues to worsen will need to contact HCP and inquire if dialysis would be consistent with patient wishes. On presentation with 2.34 with prior baseline 0.6-0.8 in 2019  Likely prerenal etiology in setting of poor intake 22 UTI as well as acute limb ischemia causing rhabdo and potential component of post-renal as had been retaining prior to arevalo placement found to have bilateral hydro  -urine lytes and repeat UA with microscopic analysis  -Fluids as above  -Arevalo in palce  -BMP q6h  -CK q6h monitoring for rhabdo  -NS @100 for 24h  -If renal failure continues to worsen will need to contact HCP and inquire if dialysis would be consistent with patient wishes. On presentation to [facility] leukocytosis however afebrile, normocardic, normal respirations   Receiving tx at [facility] VS for UTI  -obtain UA  - Zosyn (4/2 - )  - Vanco (4/2- )  -monitor fever curve, wbc Patient was recent diagnosed with UTI  -C/w zosyn for now  -F/u with culture   -Possibly contributing to encephalopathy as well

## 2022-04-01 NOTE — ED PROVIDER NOTE - PROGRESS NOTE DETAILS
Honorio Gatica D.O., PGY3 (Resident)  Vasc at bedside. Patient with limb ischemia. LLE also edematous, elevated cK from LVS labs. No palpable or dopplerable pulses. Vasc requesting VA duplex arterial. No answer from vasc lab. Message left. Xrays ordered to eval for fx given concern for development compartment syndrome in setting of limb ischemia and likely muscle damage. Dr Eli at bedside w pt and daughter. arevalo placed, dark tea colored urine. labs ordered, IVF, pt went to [facility]. vascular lab will be able to obtain studies. Vascular fellow with pt son and daughter,  family would like to make pt DNR. pt palliative care. no intervention. Jonah Maldonado to admit pt. Dw Dr Maldonado to admit pt. pt care transferred Vascular techMary Carmen, called pt has an extensive arterial occulusion of left leg. pt  was transferred to floor already. contacted medicine to inform of finding.

## 2022-04-01 NOTE — H&P ADULT - ASSESSMENT
89F hx of HTN, HLD, Hypothyroidism, dementia presenting from [facility] with acute limb ischemia and UTI.

## 2022-04-01 NOTE — ED ADULT NURSE NOTE - CHIEF COMPLAINT QUOTE
Transfer from [place] for left LE arterial occlusion, foot noted to be discolored and cold to touch, daughter at bedside, hx of dementia, heparin infusing at 11units/hr

## 2022-04-01 NOTE — ED PROVIDER NOTE - PROGRESS NOTE DETAILS
Dr Rodriguez called, wants surgery PA to evaluate the patient surgery PA called for consult surgery PA called, from their discussion pts daughter requests everything be done for her, Dr Eli from [facility] made aware, transfer center called pt to be transferred to [facility] to dr Eli surgery PA evaluating patient

## 2022-04-01 NOTE — ED ADULT NURSE NOTE - NSIMPLEMENTINTERV_GEN_ALL_ED
Implemented All Fall with Harm Risk Interventions:  [place] to call system. Call bell, personal items and telephone within reach. Instruct patient to call for assistance. Room bathroom lighting operational. Non-slip footwear when patient is off stretcher. Physically safe environment: no spills, clutter or unnecessary equipment. Stretcher in lowest position, wheels locked, appropriate side rails in place. Provide visual cue, wrist band, yellow gown, etc. Monitor gait and stability. Monitor for mental status changes and reorient to person, place, and time. Review medications for side effects contributing to fall risk. Reinforce activity limits and safety measures with patient and family. Provide visual clues: red socks.

## 2022-04-01 NOTE — CONSULT NOTE ADULT - SUBJECTIVE AND OBJECTIVE BOX
Vascular Surgery Consult    HPI: 89 year old female with h/o HLD, thyroid disease and advanced demential presents today sharon accompanied with her daughter who states that she has not been herself for the last two days, pts symptoms did start eight days ago, at that time she developed diarrhea which last four days, pt was given Immodium and placed on a diet by her PMD, the diarrhea did resolved, however, now since Monday, urinary odor and discoloration noted, pt was started on cipro on Wednesday for presumed UTI, pt was able to take the pill orally x 1 day, since yesterday pt has had decreased appetite, has had no bowel movement and decreased appetite and lethargic, this morning at 7:30am the pt's aide noticed pt's left lower extremity to be purple in color with area of redness in mid inner L calf with hardness, daughter denies any trauma to left leg. Pt able to move left foot c/o sever pain and cold.      PAST MEDICAL HISTORY:  Hypothyroid    Dementia        PAST SURGICAL HISTORY:  No significant past surgical history        MEDICATIONS:  heparin   Injectable 4500 Unit(s) IV Push once  heparin   Injectable 4500 Unit(s) IV Push every 6 hours PRN  heparin   Injectable 2000 Unit(s) IV Push every 6 hours PRN  heparin  Infusion.  Unit(s)/Hr IV Continuous <Continuous>      ALLERGIES:  No Known Allergies      VITALS & I/Os:  Vital Signs Last 24 Hrs  T(C): 36.3 (01 Apr 2022 15:56), Max: 37.2 (01 Apr 2022 12:10)  T(F): 97.4 (01 Apr 2022 15:56), Max: 99 (01 Apr 2022 12:10)  HR: 95 (01 Apr 2022 15:56) (59 - 95)  BP: 105/71 (01 Apr 2022 15:56) (105/71 - 114/75)  BP(mean): --  RR: 16 (01 Apr 2022 15:56) (16 - 16)  SpO2: 96% (01 Apr 2022 15:56) (93% - 96%)    I&O's Summary      PHYSICAL EXAM:  General: No acute distress  Respiratory: Nonlabored  Cardiovascular: RRR  Abdominal: Soft, nondistended, nontender. No rebound or guarding. No organomegaly, no palpable mass.  Extremities: Warm    LABS:                        12.4   15.78 )-----------( 327      ( 01 Apr 2022 13:26 )             36.8     04-01    142  |  107  |  54<H>  ----------------------------<  126<H>  4.6   |  28  |  2.34<H>    Ca    8.0<L>      01 Apr 2022 13:26    TPro  6.8  /  Alb  2.0<L>  /  TBili  0.8  /  DBili  x   /  AST  122<H>  /  ALT  56  /  AlkPhos  227<H>  04-01    Lactate: Lactate, Blood: 1.7 mmol/L (04-01 @ 13:26)     PT/INR - ( 01 Apr 2022 13:26 )   PT: 13.4 sec;   INR: 1.12 ratio         PTT - ( 01 Apr 2022 13:26 )  PTT:31.3 sec    CARDIAC MARKERS ( 01 Apr 2022 13:26 )  x     / x     / 4243 U/L / x     / 28.9 ng/mL            IMAGING:                                                                                              Vascular Surgery Consult    HPI: 89 year old female with h/o HLD, thyroid disease and advanced demential presents today sharon accompanied with her daughter who states that she has not been herself for the last two days, pts symptoms did start eight days ago, at that time she developed diarrhea which last four days, pt was given Immodium and placed on a diet by her PMD, the diarrhea did resolved, however, now since Monday, urinary odor and discoloration noted, pt was started on cipro on Wednesday for presumed UTI, pt was able to take the pill orally x 1 day, since yesterday pt has had decreased appetite, has had no bowel movement and decreased appetite and lethargic, this morning at 7:30am the pt's aide noticed pt's left lower extremity to be purple in color with area of redness in mid inner L calf with hardness, daughter denies any trauma to left leg. Pt able to move left foot c/o sever pain and cold. Daughter at bedside states she is agreeable to any interventions to save leg but if she needs an amputation she is also agreeable to that as well. Pt's daughter states that pt was ambulating with walker 2weeks ago.      PAST MEDICAL HISTORY:  Hypothyroid  Dementia    PAST SURGICAL HISTORY:  No significant past surgical history        MEDICATIONS:  heparin   Injectable 4500 Unit(s) IV Push once  heparin   Injectable 4500 Unit(s) IV Push every 6 hours PRN  heparin   Injectable 2000 Unit(s) IV Push every 6 hours PRN  heparin  Infusion.  Unit(s)/Hr IV Continuous <Continuous>      ALLERGIES:  No Known Allergies      VITALS & I/Os:  Vital Signs Last 24 Hrs  T(C): 36.3 (01 Apr 2022 15:56), Max: 37.2 (01 Apr 2022 12:10)  T(F): 97.4 (01 Apr 2022 15:56), Max: 99 (01 Apr 2022 12:10)  HR: 95 (01 Apr 2022 15:56) (59 - 95)  BP: 105/71 (01 Apr 2022 15:56) (105/71 - 114/75)  BP(mean): --  RR: 16 (01 Apr 2022 15:56) (16 - 16)  SpO2: 96% (01 Apr 2022 15:56) (93% - 96%)    I&O's Summary    PE:  GENERAL: elderly female, lying in bed, NAD. On 6L Nc sat 95%. Otherwise Vital signs are within normal limits  EYES: EOMi, sclera anicteric  HENT: NC/AT, slighty dry mucous membranes  NECK: Supple, trachea midline  LUNG: Nonlabored respirations, no wheezes, rales  CV: RRR, Pulses- Radial: 2+ bilateral and equal; No palpable or dopplerable Left pop/dp/pt pulses  ABDOMEN: +distended abdomen,   MSK: + LLE swelling, area of bruising/erythema and firmness mid calf  SKIN: + blanchable purple discoloration to LLE  NEURO: AAO to person (NOT place, time, event), no tremor    LABS:                        12.4   15.78 )-----------( 327      ( 01 Apr 2022 13:26 )             36.8     04-01    142  |  107  |  54<H>  ----------------------------<  126<H>  4.6   |  28  |  2.34<H>    Ca    8.0<L>      01 Apr 2022 13:26    TPro  6.8  /  Alb  2.0<L>  /  TBili  0.8  /  DBili  x   /  AST  122<H>  /  ALT  56  /  AlkPhos  227<H>  04-01    Lactate: Lactate, Blood: 1.7 mmol/L (04-01 @ 13:26)     PT/INR - ( 01 Apr 2022 13:26 )   PT: 13.4 sec;   INR: 1.12 ratio         PTT - ( 01 Apr 2022 13:26 )  PTT:31.3 sec    CARDIAC MARKERS ( 01 Apr 2022 13:26 )  x     / x     / 4243 U/L / x     / 28.9 ng/mL      IMAGING:  < from: CT Abdomen and Pelvis No Cont (04.01.22 @ 14:47) >  FINDINGS:  LOWER CHEST: Trace pericardial effusion. Abnormal opacities are   identified within the right middle lobe and bilateral lower lobes, with   greatest involvement noted within the right lower lobe, likely   representative of a combination of both atelectasis and consolidation. No   pleural effusions identified.    LIVER: Within normal limits.  BILE DUCTS: Normal caliber.  GALLBLADDER: Cholecystectomy.  SPLEEN: Within normal limits.  PANCREAS: Within normal limits.  ADRENALS: Within normal limits.  KIDNEYS/URETERS: Mild bilateral hydronephrosis and hydroureter likely   related to bladder distention. No renal calculi. No space-occupying   lesions.    BLADDER: Markedly distended, likely accounting for abdominal distention.  REPRODUCTIVE ORGANS: Uterus appears unremarkable.    BOWEL: Fecal material scattered throughout the colon. No evidencefor   mechanical bowel obstruction. No colonic wall thickening. Large hiatal   hernia. The appendix is not visualized.  PERITONEUM: No free intraperitoneal air or fluid.  VESSELS: Within normal limits.  RETROPERITONEUM/LYMPH NODES: No lymphadenopathy.  ABDOMINAL WALL: Within normal limits.  BONES: Degenerative changes. Status post left hip arthroplasty.   Intervertebral disc space narrowing L5-S1.    IMPRESSION:  Marked bladder distention with mild bilateral hydronephrosis and   hydroureter. See full discussion above.    < from: CT Head No Cont (04.01.22 @ 14:48) >  FINDINGS:  The ventricles and sulci are prominent, compatible with age-related   generalized cerebral volume loss.   There is no CT evidence for acute   cerebral cortical infarct. There is no evidence of hemorrhage.   No mass   effect is found in the brain.  There is no midline shift or herniation   pattern.      The visualized portions of the paranasal sinuses and mastoid air cells   are clear.    IMPRESSION:    No evidence of acute intracranial abnormality.  No evidence of hemorrhage.

## 2022-04-01 NOTE — ED CLERICAL - NS ED CLERK NOTE PRE-ARRIVAL INFORMATION; ADDITIONAL PRE-ARRIVAL INFORMATION
Left foot cold, purple, decreased pulses.  Sent for vascular - Dr Eli.  Lethargic, decreased appetite, diarrhea, unable to take po.  Recent UTI- took Cipro X 1 day.  Hx thyroid disease, dementia.

## 2022-04-01 NOTE — ED PROVIDER NOTE - CLINICAL SUMMARY MEDICAL DECISION MAKING FREE TEXT BOX
plan reviewed labs from S. ordered xray of left foot. Vascular study. called vascular lab. no answer. Vascular surgery at bedside.

## 2022-04-01 NOTE — CONSULT NOTE ADULT - SUBJECTIVE AND OBJECTIVE BOX
HPI:   89F hx of HTN, HLD, Hyperthyroidism who presents from [facility] with 1 day of progressive L foot redness and now purple discoloration and pain. Pt not responsive at time of evaluation. Per daughter, her brother visited pt this AM and noted left foot mottled (someone visits every day and now noted for the first time). Noted to be in pain and lethargic. She was taken to [facility] VS where no palpable pulses per chart review so patient given heparin bolus -> placed on heparin gtt. There patient Cr 2, cpk 4000s. Trop 20s. Per family, prior to recent UTI pt ambulatory with walker.     In ED pt remains somnolent, HD stable. Pt's HCP expresses that they would prefer minimal intervention at this time and would likely opt for palliative/comfort measures. Family expresses understanding of poor prognosis.       PAST MEDICAL & SURGICAL HISTORY:  Hypothyroid    Dementia    No significant past surgical history        ROS: Negative except for HPI    MEDICATIONS:  Unable to obtain given pt clinical condition     Allergies    No Known Allergies    Intolerances        SOCIAL HISTORY: Unable to obtain given pt mental status     FAMILY HISTORY:  No pertinent hx     Vital Signs Last 24 Hrs  T(C): 36.3 (01 Apr 2022 15:56), Max: 37.2 (01 Apr 2022 12:10)  T(F): 97.4 (01 Apr 2022 15:56), Max: 99 (01 Apr 2022 12:10)  HR: 95 (01 Apr 2022 15:56) (59 - 95)  BP: 105/71 (01 Apr 2022 15:56) (105/71 - 114/75)  BP(mean): --  RR: 16 (01 Apr 2022 15:56) (16 - 16)  SpO2: 96% (01 Apr 2022 15:56) (93% - 96%)    PHYSICAL EXAM:    Constitutional: Somnolent, minimally responsive   HEENT: PERRLA, EOMI  Neck: Supple   Respiratory: Unlabored   Cardiovascular: RRR  Gastrointestinal: soft, NT/ND  Extremities: LLE mottled to mid shin, TTP. Foot warm to touch. No wounds noted. LLE WNL. Unable to obtain motor/sensory exam.   Vascular: RLE 2+ peripheral pulses; LLE 2+fem, no pop/dp/pt   Neurological: A/O x 0      LABS:                        12.4   15.78 )-----------( 327      ( 01 Apr 2022 13:26 )             36.8     04-01    142  |  107  |  54<H>  ----------------------------<  126<H>  4.6   |  28  |  2.34<H>    Ca    8.0<L>      01 Apr 2022 13:26    TPro  6.8  /  Alb  2.0<L>  /  TBili  0.8  /  DBili  x   /  AST  122<H>  /  ALT  56  /  AlkPhos  227<H>  04-01    PT/INR - ( 01 Apr 2022 13:26 )   PT: 13.4 sec;   INR: 1.12 ratio         PTT - ( 01 Apr 2022 13:26 )  PTT:31.3 sec

## 2022-04-01 NOTE — ED ADULT NURSE REASSESSMENT NOTE - NS ED NURSE REASSESS COMMENT FT1
urine specimen collected and sent to lab pt was straight cath, urine specimen collected and sent to lab

## 2022-04-01 NOTE — ED PROVIDER NOTE - NEUROLOGICAL, MLM
unable to assess, pt had h/o advanced dementia, noted to by lethargic at this time, pt does moan w pain with palpation of her LLE

## 2022-04-01 NOTE — H&P ADULT - NSHPPHYSICALEXAM_GEN_ALL_CORE
T(C): 36.2 (04-01-22 @ 21:30), Max: 37.2 (04-01-22 @ 12:10)  HR: 90 (04-01-22 @ 21:30) (59 - 95)  BP: 116/62 (04-01-22 @ 21:30) (105/71 - 116/62)  RR: 17 (04-01-22 @ 21:30) (16 - 17)  SpO2: 95% (04-01-22 @ 21:30) (93% - 96%)    GENERAL: elderly, somnolent  EYES: sclera clear, no exudates  ENMT: Dry lips  NECK: supple, soft, no thyromegaly noted  LUNGS: good air entry bilaterally, clear to auscultation, symmetric breath sounds, no wheezing or rhonchi appreciated  HEART: soft S1/S2, regular rate and rhythm, no murmurs noted, no lower extremity edema  GASTROINTESTINAL: abdomen is soft, nontender, nondistended, normoactive bowel sounds, no palpable masses  INTEGUMENT: mottling of left lower extremity  MUSCULOSKELETAL: cool left lower extremity extending to mid calf with mottling and 0 pulses PT and dorsalis pedis. R PT 2+ and DP 2+  NEUROLOGIC: AOx0. does not rouse to noxious stimuli.   PSYCHIATRIC: unable to assess due to acute metabolic encephalopathy  HEME/LYMPH: no palpable supraclavicular nodules, no obvious ecchymosis or petechiae

## 2022-04-01 NOTE — ED ADULT NURSE NOTE - OBJECTIVE STATEMENT
A&O x0 reporting to ED, transfer from [facility]. Patient c/o LLE for one day with redness, purple discoloration, pain. Pain recently had loss of spouse 8 weeks ago and as per family has had rapid decline. patient moaning in the bed, not participating in interview. Family reports tea-colored urine. Patient arrived to ED on heparin gtt at 1100 units/hr. Patient now DNR/DNI. Has arevalo catheter placed.

## 2022-04-01 NOTE — H&P ADULT - PROBLEM SELECTOR PLAN 5
No home anti-hypertensives  continue to monitor VS likely primarily driven by UTI however will need to monitor for electrolyte disturbances driving acute encephalopathy by rhabdomyolysis from acute limb ischemia  -treat underlying UTI with abx and acute limb ischemia with heparin gtt Due to acute limb ischemia  Uptrending CK   -monitor CK q6h  -monitor bmp q6h  -IVF @100/h  -if worsening renal function in setting of rhabdo will need to consider renal consult if consistent with family wishes.

## 2022-04-01 NOTE — ED PROVIDER NOTE - PHYSICAL EXAMINATION
GENERAL: elderly female, lying in bed, NAD. On 6L Nc sat 95%. Otherwise Vital signs are within normal limits  EYES: Conjunctiva noninjected or pale, sclera anicteric  HENT: NC/AT, slighty dry mucous membranes  NECK: Supple, trachea midline  LUNG: Nonlabored respirations, no wheezes, rales  CV: RRR, Pulses- Radial: 2+ bilateral and equal; No palpable or dopplerable pop/dp/pt pulses  ABDOMEN: +distended abdomen,   MSK: + RLE swelling, firm  SKIN: + blanchable purple discoloration to RLE  NEURO: AAOx0 (NOT to person, place, time, event), no tremor

## 2022-04-01 NOTE — ED PROVIDER NOTE - OBJECTIVE STATEMENT
89F hx of HTN, HLD, Hyperthyroidism who presents from [facility] 2/2 1 day of progressive L foot redness and now purple discoloration and pain. No advanced imaging of LE done there. No palpable pulses per chart review so patient given heparin bolus -> placed on heparin gtt. 89F hx of HTN, HLD, Hyperthyroidism who presents from [facility] 2/2 1 day of progressive L foot redness and now purple discoloration and pain. No advanced imaging of LE done there. No palpable pulses per chart review so patient given heparin bolus -> placed on heparin gtt. Patient Cr 2, cpk 4000s. Trop 20s.

## 2022-04-01 NOTE — H&P ADULT - PROBLEM SELECTOR PLAN 8
AC heparin gtt  Diet NPO, speech and swallow once safe to swallow  Dispo Pending palliative care consult in AM to guide conversation Levothyroxine 50mg and 75mg on alternating days  HOLD po levothyroxine as no NGT desired and cannot safely swallow HOLD home seroquel and gabapentin patient is altered and cannot take po

## 2022-04-01 NOTE — H&P ADULT - NSHPSOCIALHISTORY_GEN_ALL_CORE
Lives with 2 aides  Ambulates with a walker and assistance at baseline  Requires assistance in all ADLs

## 2022-04-01 NOTE — H&P ADULT - PROBLEM SELECTOR PLAN 9
AC heparin gtt  Diet NPO, speech and swallow once safe to swallow  Dispo Pending palliative care consult in AM to guide conversation Levothyroxine 50mg and 75mg on alternating days  HOLD po levothyroxine as no NGT desired and cannot safely swallow

## 2022-04-01 NOTE — H&P ADULT - PROBLEM SELECTOR PLAN 1
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 per goals of care lab draws and fluids are in line with patient wishes  -heparin gtt  -pain management with dilaudid 0.25mg q4h PRN moderate pain and dilaudid 0.5mg q4h PRN for severe pain  -If pain persists or changes in character low threshold to XR tib/fib and ankle.   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 per goals of care lab draws and fluids are in line with patient wishes  -C/w broad spectrum abx-zosyn and vanco by level   -heparin gtt  -pain management with dilaudid 0.25mg q4h PRN moderate pain and dilaudid 0.5mg q4h PRN for severe pain  -If pain persists or changes in character low threshold to XR tib/fib and ankle.   -f/u  vascular surgery recommendations

## 2022-04-01 NOTE — CONSULT NOTE ADULT - ASSESSMENT
89 year old female with h/o HLD, thyroid disease and advanced demential presents today biba accompanied with her daughter with c/o cold LLE, pt found to have no palpable or dopplerable pulses inLLE  as discussed wth Dr. Rodriguez and Dr. Eli pt should be started on Hep drip and transferred emergently to [facility] for possible revascularization if possible.  plan discussed with Pt's daughter and ER attending

## 2022-04-01 NOTE — H&P ADULT - HISTORY OF PRESENT ILLNESS
89F hx of HTN, HLD, Hyperthyroidism who presents from [facility] with 1 day of progressive L foot redness and now purple discoloration and pain. Pt not responsive at time of evaluation. Per daughter, her brother visited pt this AM and noted left foot mottled (someone visits every day and now noted for the first time). Noted to be in pain and lethargic. She was taken to [facility] VS where no palpable pulses per chart review so patient given heparin bolus -> placed on heparin gtt. There patient Cr 2, cpk 4000s. Trop 20s. Per family, prior to recent UTI pt ambulatory with walker.  89F hx of HTN, HLD, Hypothyroidism who presents from [facility] with 1 day of progressive L foot redness and now purple discoloration and pain. Pt not responsive at time of evaluation. Per daughter, her brother visited pt this AM and noted left foot mottled (someone visits every day and now noted for the first time). Noted to be in pain and lethargic. She was taken to [facility] VS where no palpable pulses per chart review so patient given heparin bolus -> placed on heparin gtt. There patient Cr 2, cpk 4000s. Trop 20s. Per family, prior to recent UTI pt ambulatory with walker.     Collateral obtained from HCP  Herve Son- No previous blood clots. Prescribed abx Wednesday 3/30 for UTI. Previous to that time had diarrhea for about one week. Was listless and unresponsive about 1 week ago. Before that time, fluctuating mental status with "bad dementia" but in general with a personality and was able to walk with assistance and a walker and performed ADLs with assistance including performing transfers. Last few days with "challenging" po intake and in last 24h refusing food and drink. Prior to that time would eat. Has two aides at home. One is full time and the other one comes 4 hours a day. States that Wednesday 3/30 the aide was able to have mother singing. Nonverbal past two days. Last dose of abx prior to thursday, patient spit up the medication. Confirmed does not want surgical intervention at this time with acute limb ischemia. Would like antibiotics and IVF but no nasogastric tube or artificial feeding that would artificially prolong her life. States he would like her to "die a natural death" and that decision would be in adherence with patient wishes if she could otherwise make decisions for herself.  89y F pmhx HTN HLD hypothyroidism presenting from [facility] with 1d progression left foot discoloration and pain. Unable to obtain HPI from patient due to acute encephalopathy. While at VS given heparin and 1L of fluids.    Collateral obtained from HCP  Herve Son- No previous blood clots. Prescribed abx Wednesday 3/30 for UTI. Previous to that time had diarrhea for about one week. Was listless and unresponsive about 1 week ago. Before that time, fluctuating mental status with "bad dementia" but in general with a personality and was able to walk with assistance and a walker and performed ADLs with assistance including performing transfers. Last few days with "challenging" po intake and in last 24h refusing food and drink. Prior to that time would eat. Has two aides at home. One is full time and the other one comes 4 hours a day. States that Wednesday 3/30 the aide was able to have mother singing. Nonverbal past two days. Last dose of abx prior to thursday, patient spit up the medication. Confirmed does not want surgical intervention at this time with acute limb ischemia. Would like antibiotics and IVF but no nasogastric tube or artificial feeding that would artificially prolong her life. States he would like her to "die a natural death" and that decision would be in adherence with patient wishes if she could otherwise make decisions for herself.

## 2022-04-01 NOTE — H&P ADULT - PROBLEM SELECTOR PLAN 2
On presentation to [facility] leukocytosis however afebrile, normocardic, normal respirations   Receiving tx at [facility] VS for UTI  -Continue with CTX 1g daily  -monitor fever curve, wbc On presentation to [facility] leukocytosis however afebrile, normocardic, normal respirations   Receiving tx at [facility] VS for UTI  -obtain UA  - CTX 1g daily  -monitor fever curve, wbc likely primarily driven by UTI however will need to monitor for electrolyte disturbances driving acute encephalopathy by rhabdomyolysis from acute limb ischemia  -treat underlying UTI with abx and acute limb ischemia with heparin gtt Most likely due to infectious and metabolic etiology   -C/w broad spectrum abx for limb ischemia/UTI and IVF for rhabdomyolysis   -CT head showed no acute finding   -Less likely due to toxic/medications  -Aspiration and seizure precautions   -DNI/DNR

## 2022-04-01 NOTE — ED PROVIDER NOTE - CLINICAL SUMMARY MEDICAL DECISION MAKING FREE TEXT BOX
pt presented today for altered mental status x 2 days with malodorous and dark colored urine, pt started on oral cipro, tolerated it for one day, no longer taking oral, this morning LLE discoloration noted to her foot which is also cool to touch and poor pulses

## 2022-04-01 NOTE — ED PROVIDER NOTE - ATTENDING CONTRIBUTION TO CARE
Attending Statement: I have personally seen and examined this patient. I have fully participated in the care of this patient. I have reviewed all pertinent clinical information, including history physical exam, plan and the Resident's note and agree except as noted   89yoF hx of HTN, HLD, dementia, hyperthyroidism, transferred from [facility] for left foot redness and discoloration today. PT less awake per daughter, states that she is more confused. No trauma.  Family noted left foot was purple this morning, went to [facility], evaluated by surgery started on heparin gtt.  Vital signs noted. elderly female no resp distress grimace to touch of left foot/calf. left foot is purple, no doppler pulse noted. tender calf.   plan reviewed labs from [facility]. ordered xray of left foot. Vascular study. called vascular lab. no answer. Vascular surgery at bedside.

## 2022-04-02 LAB
ANION GAP SERPL CALC-SCNC: 10 MMOL/L — SIGNIFICANT CHANGE UP (ref 7–14)
ANION GAP SERPL CALC-SCNC: 10 MMOL/L — SIGNIFICANT CHANGE UP (ref 7–14)
ANION GAP SERPL CALC-SCNC: 11 MMOL/L — SIGNIFICANT CHANGE UP (ref 7–14)
APPEARANCE UR: ABNORMAL
APTT BLD: 65.2 SEC — HIGH (ref 27–36.3)
APTT BLD: 68.8 SEC — HIGH (ref 27–36.3)
APTT BLD: 84 SEC — HIGH (ref 27–36.3)
BACTERIA # UR AUTO: ABNORMAL
BILIRUB UR-MCNC: NEGATIVE — SIGNIFICANT CHANGE UP
BUN SERPL-MCNC: 23 MG/DL — SIGNIFICANT CHANGE UP (ref 7–23)
BUN SERPL-MCNC: 28 MG/DL — HIGH (ref 7–23)
BUN SERPL-MCNC: 45 MG/DL — HIGH (ref 7–23)
CALCIUM SERPL-MCNC: 7.1 MG/DL — LOW (ref 8.4–10.5)
CALCIUM SERPL-MCNC: 7.4 MG/DL — LOW (ref 8.4–10.5)
CALCIUM SERPL-MCNC: 8.1 MG/DL — LOW (ref 8.4–10.5)
CHLORIDE SERPL-SCNC: 108 MMOL/L — HIGH (ref 98–107)
CHLORIDE SERPL-SCNC: 112 MMOL/L — HIGH (ref 98–107)
CHLORIDE SERPL-SCNC: 114 MMOL/L — HIGH (ref 98–107)
CK SERPL-CCNC: 4571 U/L — HIGH (ref 25–170)
CK SERPL-CCNC: 5495 U/L — HIGH (ref 25–170)
CO2 SERPL-SCNC: 23 MMOL/L — SIGNIFICANT CHANGE UP (ref 22–31)
CO2 SERPL-SCNC: 25 MMOL/L — SIGNIFICANT CHANGE UP (ref 22–31)
CO2 SERPL-SCNC: 27 MMOL/L — SIGNIFICANT CHANGE UP (ref 22–31)
COLOR SPEC: ABNORMAL
CREAT SERPL-MCNC: 0.97 MG/DL — SIGNIFICANT CHANGE UP (ref 0.5–1.3)
CREAT SERPL-MCNC: 1.05 MG/DL — SIGNIFICANT CHANGE UP (ref 0.5–1.3)
CREAT SERPL-MCNC: 1.62 MG/DL — HIGH (ref 0.5–1.3)
DIFF PNL FLD: ABNORMAL
EGFR: 29 ML/MIN/1.73M2 — LOW
EGFR: 49 ML/MIN/1.73M2 — LOW
EGFR: 54 ML/MIN/1.73M2 — LOW
EPI CELLS # UR: 1 /HPF — SIGNIFICANT CHANGE UP (ref 0–5)
GLUCOSE SERPL-MCNC: 104 MG/DL — HIGH (ref 70–99)
GLUCOSE SERPL-MCNC: 124 MG/DL — HIGH (ref 70–99)
GLUCOSE SERPL-MCNC: 125 MG/DL — HIGH (ref 70–99)
GLUCOSE UR QL: NEGATIVE — SIGNIFICANT CHANGE UP
HCT VFR BLD CALC: 30.8 % — LOW (ref 34.5–45)
HGB BLD-MCNC: 10.1 G/DL — LOW (ref 11.5–15.5)
HYALINE CASTS # UR AUTO: 2 /LPF — SIGNIFICANT CHANGE UP (ref 0–7)
INR BLD: 1.19 RATIO — HIGH (ref 0.88–1.16)
KETONES UR-MCNC: NEGATIVE — SIGNIFICANT CHANGE UP
LEUKOCYTE ESTERASE UR-ACNC: ABNORMAL
MAGNESIUM SERPL-MCNC: 2.2 MG/DL — SIGNIFICANT CHANGE UP (ref 1.6–2.6)
MAGNESIUM SERPL-MCNC: 2.4 MG/DL — SIGNIFICANT CHANGE UP (ref 1.6–2.6)
MAGNESIUM SERPL-MCNC: 2.6 MG/DL — SIGNIFICANT CHANGE UP (ref 1.6–2.6)
MCHC RBC-ENTMCNC: 28.5 PG — SIGNIFICANT CHANGE UP (ref 27–34)
MCHC RBC-ENTMCNC: 32.8 GM/DL — SIGNIFICANT CHANGE UP (ref 32–36)
MCV RBC AUTO: 87 FL — SIGNIFICANT CHANGE UP (ref 80–100)
NITRITE UR-MCNC: POSITIVE
NRBC # BLD: 0 /100 WBCS — SIGNIFICANT CHANGE UP
NRBC # FLD: 0 K/UL — SIGNIFICANT CHANGE UP
PH UR: 6.5 — SIGNIFICANT CHANGE UP (ref 5–8)
PHOSPHATE SERPL-MCNC: 1.9 MG/DL — LOW (ref 2.5–4.5)
PHOSPHATE SERPL-MCNC: 3.3 MG/DL — SIGNIFICANT CHANGE UP (ref 2.5–4.5)
PHOSPHATE SERPL-MCNC: 4.2 MG/DL — SIGNIFICANT CHANGE UP (ref 2.5–4.5)
PLATELET # BLD AUTO: 267 K/UL — SIGNIFICANT CHANGE UP (ref 150–400)
POTASSIUM SERPL-MCNC: 3.5 MMOL/L — SIGNIFICANT CHANGE UP (ref 3.5–5.3)
POTASSIUM SERPL-MCNC: 3.8 MMOL/L — SIGNIFICANT CHANGE UP (ref 3.5–5.3)
POTASSIUM SERPL-MCNC: 4.2 MMOL/L — SIGNIFICANT CHANGE UP (ref 3.5–5.3)
POTASSIUM SERPL-SCNC: 3.5 MMOL/L — SIGNIFICANT CHANGE UP (ref 3.5–5.3)
POTASSIUM SERPL-SCNC: 3.8 MMOL/L — SIGNIFICANT CHANGE UP (ref 3.5–5.3)
POTASSIUM SERPL-SCNC: 4.2 MMOL/L — SIGNIFICANT CHANGE UP (ref 3.5–5.3)
PROT UR-MCNC: ABNORMAL
PROTHROM AB SERPL-ACNC: 13.8 SEC — HIGH (ref 10.5–13.4)
RBC # BLD: 3.54 M/UL — LOW (ref 3.8–5.2)
RBC # FLD: 15.7 % — HIGH (ref 10.3–14.5)
RBC CASTS # UR COMP ASSIST: 11 /HPF — HIGH (ref 0–4)
SODIUM SERPL-SCNC: 145 MMOL/L — SIGNIFICANT CHANGE UP (ref 135–145)
SODIUM SERPL-SCNC: 146 MMOL/L — HIGH (ref 135–145)
SODIUM SERPL-SCNC: 149 MMOL/L — HIGH (ref 135–145)
SP GR SPEC: 1.02 — SIGNIFICANT CHANGE UP (ref 1–1.05)
UROBILINOGEN FLD QL: SIGNIFICANT CHANGE UP
WBC # BLD: 14.15 K/UL — HIGH (ref 3.8–10.5)
WBC # FLD AUTO: 14.15 K/UL — HIGH (ref 3.8–10.5)
WBC UR QL: 270 /HPF — HIGH (ref 0–5)

## 2022-04-02 PROCEDURE — 93010 ELECTROCARDIOGRAM REPORT: CPT

## 2022-04-02 PROCEDURE — 99223 1ST HOSP IP/OBS HIGH 75: CPT

## 2022-04-02 PROCEDURE — 99233 SBSQ HOSP IP/OBS HIGH 50: CPT

## 2022-04-02 RX ORDER — HYDROMORPHONE HYDROCHLORIDE 2 MG/ML
0.25 INJECTION INTRAMUSCULAR; INTRAVENOUS; SUBCUTANEOUS EVERY 4 HOURS
Refills: 0 | Status: DISCONTINUED | OUTPATIENT
Start: 2022-04-02 | End: 2022-04-05

## 2022-04-02 RX ORDER — POTASSIUM PHOSPHATE, MONOBASIC POTASSIUM PHOSPHATE, DIBASIC 236; 224 MG/ML; MG/ML
30 INJECTION, SOLUTION INTRAVENOUS ONCE
Refills: 0 | Status: COMPLETED | OUTPATIENT
Start: 2022-04-02 | End: 2022-04-02

## 2022-04-02 RX ORDER — LEVOTHYROXINE SODIUM 125 MCG
40 TABLET ORAL
Refills: 0 | Status: DISCONTINUED | OUTPATIENT
Start: 2022-04-02 | End: 2022-04-05

## 2022-04-02 RX ORDER — LEVOTHYROXINE SODIUM 125 MCG
60 TABLET ORAL
Refills: 0 | Status: DISCONTINUED | OUTPATIENT
Start: 2022-04-02 | End: 2022-04-05

## 2022-04-02 RX ORDER — HEPARIN SODIUM 5000 [USP'U]/ML
1100 INJECTION INTRAVENOUS; SUBCUTANEOUS
Qty: 25000 | Refills: 0 | Status: DISCONTINUED | OUTPATIENT
Start: 2022-04-02 | End: 2022-04-05

## 2022-04-02 RX ORDER — SODIUM CHLORIDE 9 MG/ML
1000 INJECTION INTRAMUSCULAR; INTRAVENOUS; SUBCUTANEOUS
Refills: 0 | Status: DISCONTINUED | OUTPATIENT
Start: 2022-04-02 | End: 2022-04-02

## 2022-04-02 RX ORDER — VANCOMYCIN HCL 1 G
500 VIAL (EA) INTRAVENOUS ONCE
Refills: 0 | Status: COMPLETED | OUTPATIENT
Start: 2022-04-02 | End: 2022-04-02

## 2022-04-02 RX ORDER — PIPERACILLIN AND TAZOBACTAM 4; .5 G/20ML; G/20ML
3.38 INJECTION, POWDER, LYOPHILIZED, FOR SOLUTION INTRAVENOUS EVERY 12 HOURS
Refills: 0 | Status: DISCONTINUED | OUTPATIENT
Start: 2022-04-02 | End: 2022-04-05

## 2022-04-02 RX ORDER — HYDROMORPHONE HYDROCHLORIDE 2 MG/ML
0.5 INJECTION INTRAMUSCULAR; INTRAVENOUS; SUBCUTANEOUS EVERY 4 HOURS
Refills: 0 | Status: DISCONTINUED | OUTPATIENT
Start: 2022-04-02 | End: 2022-04-05

## 2022-04-02 RX ORDER — SODIUM CHLORIDE 9 MG/ML
1000 INJECTION, SOLUTION INTRAVENOUS
Refills: 0 | Status: DISCONTINUED | OUTPATIENT
Start: 2022-04-02 | End: 2022-04-04

## 2022-04-02 RX ORDER — ACETAMINOPHEN 500 MG
650 TABLET ORAL ONCE
Refills: 0 | Status: COMPLETED | OUTPATIENT
Start: 2022-04-02 | End: 2022-04-02

## 2022-04-02 RX ADMIN — PIPERACILLIN AND TAZOBACTAM 25 GRAM(S): 4; .5 INJECTION, POWDER, LYOPHILIZED, FOR SOLUTION INTRAVENOUS at 05:53

## 2022-04-02 RX ADMIN — HEPARIN SODIUM 1100 UNIT(S)/HR: 5000 INJECTION INTRAVENOUS; SUBCUTANEOUS at 09:22

## 2022-04-02 RX ADMIN — PANTOPRAZOLE SODIUM 40 MILLIGRAM(S): 20 TABLET, DELAYED RELEASE ORAL at 11:35

## 2022-04-02 RX ADMIN — SODIUM CHLORIDE 75 MILLILITER(S): 9 INJECTION, SOLUTION INTRAVENOUS at 17:22

## 2022-04-02 RX ADMIN — SODIUM CHLORIDE 100 MILLILITER(S): 9 INJECTION INTRAMUSCULAR; INTRAVENOUS; SUBCUTANEOUS at 10:28

## 2022-04-02 RX ADMIN — PIPERACILLIN AND TAZOBACTAM 25 GRAM(S): 4; .5 INJECTION, POWDER, LYOPHILIZED, FOR SOLUTION INTRAVENOUS at 17:22

## 2022-04-02 RX ADMIN — POTASSIUM PHOSPHATE, MONOBASIC POTASSIUM PHOSPHATE, DIBASIC 83.33 MILLIMOLE(S): 236; 224 INJECTION, SOLUTION INTRAVENOUS at 18:15

## 2022-04-02 RX ADMIN — Medication 650 MILLIGRAM(S): at 18:15

## 2022-04-02 RX ADMIN — Medication 100 MILLIGRAM(S): at 02:43

## 2022-04-02 RX ADMIN — HEPARIN SODIUM 1100 UNIT(S)/HR: 5000 INJECTION INTRAVENOUS; SUBCUTANEOUS at 01:28

## 2022-04-02 RX ADMIN — HEPARIN SODIUM 1100 UNIT(S)/HR: 5000 INJECTION INTRAVENOUS; SUBCUTANEOUS at 15:57

## 2022-04-02 RX ADMIN — Medication 40 MICROGRAM(S): at 06:34

## 2022-04-02 RX ADMIN — HEPARIN SODIUM 1100 UNIT(S)/HR: 5000 INJECTION INTRAVENOUS; SUBCUTANEOUS at 19:18

## 2022-04-02 NOTE — PATIENT PROFILE ADULT - NSPRESCRUSEDDRG_GEN_A_NUR
FISH Detail Level: Simple Additional Notes: Patient consent was obtained to proceed with the visit and recommended plan of care after discussion of all risks and benefits, including the risks of COVID-19 exposure.

## 2022-04-02 NOTE — SWALLOW BEDSIDE ASSESSMENT ADULT - ADDITIONAL RECOMMENDATIONS
Reconsult this service as patient becomes medically optimized to reassess. This service will follow as schedule permits.

## 2022-04-02 NOTE — SWALLOW BEDSIDE ASSESSMENT ADULT - SWALLOW EVAL: DIAGNOSIS
1. The patient presents with a mild-moderate oral dysphagia for puree and moderately thick liquids marked by reduced stripping of bolus from utensil, delayed bolus collection, transfer and posterior transport. 2. Mild pharyngeal dysphagia for puree and moderately thick liquid textures marked by suspected delayed initiation of pharyngeal swallow trigger with + hyolaryngeal elevation upon digital palpation. Additional consistencies not administered 2/2 patient's decreased participation/confusion. 3. Recommend dietary consult to ensure patient is adequately meeting daily caloric needs.

## 2022-04-02 NOTE — SWALLOW BEDSIDE ASSESSMENT ADULT - COMMENTS
Per charting, the patient is a "89F hx of HTN, HLD, Hypothyroidism, dementia presenting from [facility] with acute limb ischemia and UTI."    CT HEAD 4/1/22: "No evidence of acute intracranial abnormality.  No evidence of hemorrhage."    XR CHEST 4/1/22: "Patchy infiltrate/atelectasis right base. Correlate for infection. Large hiatal hernia."    Consult received and chart reviewed. The patient was seen this afternoon for an assessment of swallow function, at which time she was awake with eyes closed, utilizing jargon. The patient did not follow directives or answer yes/no questions. She required maximal encouragement to participate in evaluation 2/2 confusion. Patient vocalizing throughout evaluation.

## 2022-04-02 NOTE — CONSULT NOTE ADULT - SUBJECTIVE AND OBJECTIVE BOX
CHIEF COMPLAINT: leg pain    HISTORY OF PRESENT ILLNESS:  89y F pmhx HTN HLD hypothyroidism presenting from [facility] with 1d progression left foot discoloration and pain. Unable to obtain HPI from patient due to acute encephalopathy. While at [facility] given heparin and 1L of fluids.    Collateral obtained from chart and H&P- No previous blood clots. Prescribed abx Wednesday 3/30 for UTI. Previous to that time had diarrhea for about one week. Was listless and unresponsive about 1 week ago. Before that time, fluctuating mental status with "bad dementia" but in general with a personality and was able to walk with assistance and a walker and performed ADLs with assistance including performing transfers. Last few days with "challenging" po intake and in last 24h refusing food and drink. Prior to that time would eat. Has two aides at home. One is full time and the other one comes 4 hours a day. States that Wednesday 3/30 the aide was able to have mother singing. Nonverbal past two days. Last dose of abx prior to thursday, patient spit up the medication. Confirmed does not want surgical intervention at this time with acute limb ischemia. Would like antibiotics and IVF but no nasogastric tube or artificial feeding that would artificially prolong her life. States he would like her to "die a natural death" and that decision would be in adherence with patient wishes if she could otherwise make decisions for herself.     Allergies    No Known Allergies    Intolerances    	    MEDICATIONS:  heparin  Infusion. 1100 Unit(s)/Hr IV Continuous <Continuous>    piperacillin/tazobactam IVPB.. 3.375 Gram(s) IV Intermittent every 12 hours      HYDROmorphone  Injectable 0.25 milliGRAM(s) IV Push every 4 hours PRN  HYDROmorphone  Injectable 0.5 milliGRAM(s) IV Push every 4 hours PRN  ondansetron Injectable 4 milliGRAM(s) IV Push every 8 hours PRN    pantoprazole  Injectable 40 milliGRAM(s) IV Push daily    levothyroxine Injectable 40 MICROGram(s) IV Push <User Schedule>  levothyroxine Injectable 60 MICROGram(s) IV Push <User Schedule>    sodium chloride 0.9%. 1000 milliLiter(s) IV Continuous <Continuous>      PAST MEDICAL & SURGICAL HISTORY:  Hypothyroid    Dementia    Hypercholesterolemia    No significant past surgical history        FAMILY HISTORY:  No pertinent family history in first degree relatives        SOCIAL HISTORY:    non smoker. dependent on adls    REVIEW OF SYSTEMS:  See HPI, otherwise complete 10 point review of systems negative      [ ] Unable to obtain    PHYSICAL EXAM:  T(C): 36.5 (04-02-22 @ 05:05), Max: 37.2 (04-01-22 @ 12:10)  HR: 92 (04-02-22 @ 05:05) (59 - 95)  BP: 117/66 (04-02-22 @ 05:05) (105/71 - 117/66)  RR: 18 (04-02-22 @ 05:05) (16 - 18)  SpO2: 98% (04-02-22 @ 05:05) (93% - 98%)  Wt(kg): --  I&O's Summary    01 Apr 2022 07:01  -  02 Apr 2022 07:00  --------------------------------------------------------  IN: 0 mL / OUT: 2900 mL / NET: -2900 mL        Appearance: No Acute Distress	  HEENT:  Normal oral mucosa, PERRL, EOMI	  Cardiovascular: Normal S1 S2, 2/6 sanchez  Respiratory: Lungs clear to auscultation bilaterally  Gastrointestinal:  Soft, Non-tender, + BS	  Skin: No rashes, No ecchymoses, No cyanosis	  Neurologic: Non-focal  Extremities: No clubbing, cyanosis or edema  Vascular: dec pulses in le  Psychiatry: A & O x 3, Mood & affect appropriate    Laboratory Data:	 	    CBC Full  -  ( 02 Apr 2022 08:50 )  WBC Count : 14.15 K/uL  Hemoglobin : 10.1 g/dL  Hematocrit : 30.8 %  Platelet Count - Automated : 267 K/uL  Mean Cell Volume : 87.0 fL  Mean Cell Hemoglobin : 28.5 pg  Mean Cell Hemoglobin Concentration : 32.8 gm/dL  Auto Neutrophil # : x  Auto Lymphocyte # : x  Auto Monocyte # : x  Auto Eosinophil # : x  Auto Basophil # : x  Auto Neutrophil % : x  Auto Lymphocyte % : x  Auto Monocyte % : x  Auto Eosinophil % : x  Auto Basophil % : x    04-01    145  |  108<H>  |  45<H>  ----------------------------<  124<H>  4.2   |  27  |  1.62<H>  04-01    142  |  107  |  54<H>  ----------------------------<  126<H>  4.6   |  28  |  2.34<H>    Ca    8.1<L>      01 Apr 2022 23:49  Ca    8.0<L>      01 Apr 2022 13:26  Phos  3.3     04-01  Mg     2.60     04-01    TPro  6.8  /  Alb  2.0<L>  /  TBili  0.8  /  DBili  x   /  AST  122<H>  /  ALT  56  /  AlkPhos  227<H>  04-01      proBNP:   Lipid Profile:   HgA1c:   TSH:       CARDIAC MARKERS:            Interpretation of Telemetry: 	    ECG:  	  RADIOLOGY:  OTHER: 	    PREVIOUS DIAGNOSTIC TESTING:    [ ] Echocardiogram:  [ ] Catheterization:  [ ] Stress Test:  	    Assessment:  acute limb ischemia  UTI  encephalopathy  EMPERATRIZ  HLD  dementia  hypothyroid    Recs:  cardiac status stable  no e/o chf or cardiac ischemia  IV fluids to maintain euvolemic  abx per primary team  f/u vasc recs. likely conservative management for now  palliative evaluation  arevalo in place for bladder decompression  pain control  dvt ppx            Greater than 60 minutes spent on total encounter; more than 50% of the visit was spent counseling and/or coordinating care by the attending physician.   	  Fabiano Joyce MD   Cardiovascular Diseases  [phone]

## 2022-04-02 NOTE — PATIENT PROFILE ADULT - FALL HARM RISK - HARM RISK INTERVENTIONS
Assistance with ambulation/Assistance OOB with selected safe patient handling equipment/Communicate Risk of Fall with Harm to all staff/Discuss with provider need for PT consult/Monitor gait and stability/Reinforce activity limits and safety measures with patient and family/Tailored Fall Risk Interventions/Visual Cue: Yellow wristband and red socks/Bed in lowest position, wheels locked, appropriate side rails in place/Call bell, personal items and telephone in reach/Instruct patient to call for assistance before getting out of bed or chair/Non-slip footwear when patient is out of bed/[place] to call system/Physically safe environment - no spills, clutter or unnecessary equipment/Purposeful Proactive Rounding/Room/bathroom lighting operational, light cord in reach

## 2022-04-03 LAB
ANION GAP SERPL CALC-SCNC: 12 MMOL/L — SIGNIFICANT CHANGE UP (ref 7–14)
ANION GAP SERPL CALC-SCNC: 15 MMOL/L — HIGH (ref 7–14)
APTT BLD: 43.4 SEC — HIGH (ref 27–36.3)
APTT BLD: 56.7 SEC — HIGH (ref 27–36.3)
APTT BLD: 59.3 SEC — HIGH (ref 27–36.3)
BASOPHILS # BLD AUTO: 0.04 K/UL — SIGNIFICANT CHANGE UP (ref 0–0.2)
BASOPHILS NFR BLD AUTO: 0.2 % — SIGNIFICANT CHANGE UP (ref 0–2)
BUN SERPL-MCNC: 16 MG/DL — SIGNIFICANT CHANGE UP (ref 7–23)
BUN SERPL-MCNC: 21 MG/DL — SIGNIFICANT CHANGE UP (ref 7–23)
CALCIUM SERPL-MCNC: 7.3 MG/DL — LOW (ref 8.4–10.5)
CALCIUM SERPL-MCNC: 7.7 MG/DL — LOW (ref 8.4–10.5)
CHLORIDE SERPL-SCNC: 108 MMOL/L — HIGH (ref 98–107)
CHLORIDE SERPL-SCNC: 110 MMOL/L — HIGH (ref 98–107)
CK SERPL-CCNC: 4425 U/L — HIGH (ref 25–170)
CO2 SERPL-SCNC: 21 MMOL/L — LOW (ref 22–31)
CO2 SERPL-SCNC: 23 MMOL/L — SIGNIFICANT CHANGE UP (ref 22–31)
CREAT SERPL-MCNC: 0.7 MG/DL — SIGNIFICANT CHANGE UP (ref 0.5–1.3)
CREAT SERPL-MCNC: 0.86 MG/DL — SIGNIFICANT CHANGE UP (ref 0.5–1.3)
CULTURE RESULTS: SIGNIFICANT CHANGE UP
EGFR: 62 ML/MIN/1.73M2 — SIGNIFICANT CHANGE UP
EGFR: 80 ML/MIN/1.73M2 — SIGNIFICANT CHANGE UP
EOSINOPHIL # BLD AUTO: 0.23 K/UL — SIGNIFICANT CHANGE UP (ref 0–0.5)
EOSINOPHIL NFR BLD AUTO: 1.3 % — SIGNIFICANT CHANGE UP (ref 0–6)
GLUCOSE SERPL-MCNC: 104 MG/DL — HIGH (ref 70–99)
GLUCOSE SERPL-MCNC: 152 MG/DL — HIGH (ref 70–99)
HCT VFR BLD CALC: 31.5 % — LOW (ref 34.5–45)
HGB BLD-MCNC: 10.1 G/DL — LOW (ref 11.5–15.5)
IANC: 13.93 K/UL — HIGH (ref 1.8–7.4)
IMM GRANULOCYTES NFR BLD AUTO: 3.1 % — HIGH (ref 0–1.5)
INR BLD: 1.3 RATIO — HIGH (ref 0.88–1.16)
LYMPHOCYTES # BLD AUTO: 1.37 K/UL — SIGNIFICANT CHANGE UP (ref 1–3.3)
LYMPHOCYTES # BLD AUTO: 7.9 % — LOW (ref 13–44)
MAGNESIUM SERPL-MCNC: 2 MG/DL — SIGNIFICANT CHANGE UP (ref 1.6–2.6)
MAGNESIUM SERPL-MCNC: 2.2 MG/DL — SIGNIFICANT CHANGE UP (ref 1.6–2.6)
MCHC RBC-ENTMCNC: 27.7 PG — SIGNIFICANT CHANGE UP (ref 27–34)
MCHC RBC-ENTMCNC: 32.1 GM/DL — SIGNIFICANT CHANGE UP (ref 32–36)
MCV RBC AUTO: 86.5 FL — SIGNIFICANT CHANGE UP (ref 80–100)
MONOCYTES # BLD AUTO: 1.2 K/UL — HIGH (ref 0–0.9)
MONOCYTES NFR BLD AUTO: 6.9 % — SIGNIFICANT CHANGE UP (ref 2–14)
NEUTROPHILS # BLD AUTO: 13.93 K/UL — HIGH (ref 1.8–7.4)
NEUTROPHILS NFR BLD AUTO: 80.6 % — HIGH (ref 43–77)
NRBC # BLD: 0 /100 WBCS — SIGNIFICANT CHANGE UP
NRBC # FLD: 0 K/UL — SIGNIFICANT CHANGE UP
PHOSPHATE SERPL-MCNC: 2.1 MG/DL — LOW (ref 2.5–4.5)
PHOSPHATE SERPL-MCNC: 2.9 MG/DL — SIGNIFICANT CHANGE UP (ref 2.5–4.5)
PLATELET # BLD AUTO: 344 K/UL — SIGNIFICANT CHANGE UP (ref 150–400)
POTASSIUM SERPL-MCNC: 3.7 MMOL/L — SIGNIFICANT CHANGE UP (ref 3.5–5.3)
POTASSIUM SERPL-MCNC: 3.8 MMOL/L — SIGNIFICANT CHANGE UP (ref 3.5–5.3)
POTASSIUM SERPL-SCNC: 3.7 MMOL/L — SIGNIFICANT CHANGE UP (ref 3.5–5.3)
POTASSIUM SERPL-SCNC: 3.8 MMOL/L — SIGNIFICANT CHANGE UP (ref 3.5–5.3)
PROTHROM AB SERPL-ACNC: 15.1 SEC — HIGH (ref 10.5–13.4)
RBC # BLD: 3.64 M/UL — LOW (ref 3.8–5.2)
RBC # FLD: 15.8 % — HIGH (ref 10.3–14.5)
SODIUM SERPL-SCNC: 144 MMOL/L — SIGNIFICANT CHANGE UP (ref 135–145)
SODIUM SERPL-SCNC: 145 MMOL/L — SIGNIFICANT CHANGE UP (ref 135–145)
SPECIMEN SOURCE: SIGNIFICANT CHANGE UP
VANCOMYCIN FLD-MCNC: <4 UG/ML — SIGNIFICANT CHANGE UP
WBC # BLD: 17.3 K/UL — HIGH (ref 3.8–10.5)
WBC # FLD AUTO: 17.3 K/UL — HIGH (ref 3.8–10.5)

## 2022-04-03 PROCEDURE — 99233 SBSQ HOSP IP/OBS HIGH 50: CPT

## 2022-04-03 RX ORDER — HEPARIN SODIUM 5000 [USP'U]/ML
4000 INJECTION INTRAVENOUS; SUBCUTANEOUS EVERY 6 HOURS
Refills: 0 | Status: DISCONTINUED | OUTPATIENT
Start: 2022-04-03 | End: 2022-04-05

## 2022-04-03 RX ORDER — HEPARIN SODIUM 5000 [USP'U]/ML
2000 INJECTION INTRAVENOUS; SUBCUTANEOUS EVERY 6 HOURS
Refills: 0 | Status: DISCONTINUED | OUTPATIENT
Start: 2022-04-03 | End: 2022-04-05

## 2022-04-03 RX ORDER — MAGNESIUM HYDROXIDE 400 MG/1
30 TABLET, CHEWABLE ORAL ONCE
Refills: 0 | Status: COMPLETED | OUTPATIENT
Start: 2022-04-03 | End: 2022-04-03

## 2022-04-03 RX ORDER — VANCOMYCIN HCL 1 G
750 VIAL (EA) INTRAVENOUS ONCE
Refills: 0 | Status: COMPLETED | OUTPATIENT
Start: 2022-04-03 | End: 2022-04-03

## 2022-04-03 RX ORDER — SODIUM CHLORIDE 9 MG/ML
1000 INJECTION, SOLUTION INTRAVENOUS
Refills: 0 | Status: DISCONTINUED | OUTPATIENT
Start: 2022-04-03 | End: 2022-04-04

## 2022-04-03 RX ORDER — SENNA PLUS 8.6 MG/1
2 TABLET ORAL AT BEDTIME
Refills: 0 | Status: DISCONTINUED | OUTPATIENT
Start: 2022-04-03 | End: 2022-04-04

## 2022-04-03 RX ORDER — POLYETHYLENE GLYCOL 3350 17 G/17G
17 POWDER, FOR SOLUTION ORAL DAILY
Refills: 0 | Status: DISCONTINUED | OUTPATIENT
Start: 2022-04-03 | End: 2022-04-04

## 2022-04-03 RX ADMIN — Medication 250 MILLIGRAM(S): at 08:54

## 2022-04-03 RX ADMIN — HEPARIN SODIUM 1200 UNIT(S)/HR: 5000 INJECTION INTRAVENOUS; SUBCUTANEOUS at 07:32

## 2022-04-03 RX ADMIN — PIPERACILLIN AND TAZOBACTAM 25 GRAM(S): 4; .5 INJECTION, POWDER, LYOPHILIZED, FOR SOLUTION INTRAVENOUS at 05:09

## 2022-04-03 RX ADMIN — Medication 40 MICROGRAM(S): at 05:10

## 2022-04-03 RX ADMIN — MAGNESIUM HYDROXIDE 30 MILLILITER(S): 400 TABLET, CHEWABLE ORAL at 13:28

## 2022-04-03 RX ADMIN — HEPARIN SODIUM 1100 UNIT(S)/HR: 5000 INJECTION INTRAVENOUS; SUBCUTANEOUS at 00:42

## 2022-04-03 RX ADMIN — HEPARIN SODIUM 1200 UNIT(S)/HR: 5000 INJECTION INTRAVENOUS; SUBCUTANEOUS at 15:06

## 2022-04-03 RX ADMIN — HEPARIN SODIUM 2000 UNIT(S): 5000 INJECTION INTRAVENOUS; SUBCUTANEOUS at 08:55

## 2022-04-03 RX ADMIN — HEPARIN SODIUM 1300 UNIT(S)/HR: 5000 INJECTION INTRAVENOUS; SUBCUTANEOUS at 22:16

## 2022-04-03 RX ADMIN — SODIUM CHLORIDE 75 MILLILITER(S): 9 INJECTION, SOLUTION INTRAVENOUS at 21:25

## 2022-04-03 RX ADMIN — PIPERACILLIN AND TAZOBACTAM 25 GRAM(S): 4; .5 INJECTION, POWDER, LYOPHILIZED, FOR SOLUTION INTRAVENOUS at 18:32

## 2022-04-03 RX ADMIN — SENNA PLUS 2 TABLET(S): 8.6 TABLET ORAL at 21:25

## 2022-04-03 RX ADMIN — HEPARIN SODIUM 1200 UNIT(S)/HR: 5000 INJECTION INTRAVENOUS; SUBCUTANEOUS at 19:32

## 2022-04-03 RX ADMIN — SODIUM CHLORIDE 75 MILLILITER(S): 9 INJECTION, SOLUTION INTRAVENOUS at 08:55

## 2022-04-03 RX ADMIN — POLYETHYLENE GLYCOL 3350 17 GRAM(S): 17 POWDER, FOR SOLUTION ORAL at 13:28

## 2022-04-03 RX ADMIN — PANTOPRAZOLE SODIUM 40 MILLIGRAM(S): 20 TABLET, DELAYED RELEASE ORAL at 13:30

## 2022-04-04 LAB
ANION GAP SERPL CALC-SCNC: 11 MMOL/L — SIGNIFICANT CHANGE UP (ref 7–14)
APTT BLD: 54.2 SEC — HIGH (ref 27–36.3)
APTT BLD: 67.6 SEC — HIGH (ref 27–36.3)
APTT BLD: 75.3 SEC — HIGH (ref 27–36.3)
BASOPHILS # BLD AUTO: 0 K/UL — SIGNIFICANT CHANGE UP (ref 0–0.2)
BASOPHILS NFR BLD AUTO: 0 % — SIGNIFICANT CHANGE UP (ref 0–2)
BUN SERPL-MCNC: 15 MG/DL — SIGNIFICANT CHANGE UP (ref 7–23)
CALCIUM SERPL-MCNC: 7.8 MG/DL — LOW (ref 8.4–10.5)
CHLORIDE SERPL-SCNC: 112 MMOL/L — HIGH (ref 98–107)
CK SERPL-CCNC: 3914 U/L — HIGH (ref 25–170)
CO2 SERPL-SCNC: 24 MMOL/L — SIGNIFICANT CHANGE UP (ref 22–31)
CREAT SERPL-MCNC: 0.77 MG/DL — SIGNIFICANT CHANGE UP (ref 0.5–1.3)
EGFR: 71 ML/MIN/1.73M2 — SIGNIFICANT CHANGE UP
EOSINOPHIL # BLD AUTO: 0 K/UL — SIGNIFICANT CHANGE UP (ref 0–0.5)
EOSINOPHIL NFR BLD AUTO: 0 % — SIGNIFICANT CHANGE UP (ref 0–6)
GLUCOSE SERPL-MCNC: 107 MG/DL — HIGH (ref 70–99)
HCT VFR BLD CALC: 30.6 % — LOW (ref 34.5–45)
HGB BLD-MCNC: 9.7 G/DL — LOW (ref 11.5–15.5)
IANC: 14.39 K/UL — HIGH (ref 1.8–7.4)
INR BLD: 1.39 RATIO — HIGH (ref 0.88–1.16)
LYMPHOCYTES # BLD AUTO: 0.97 K/UL — LOW (ref 1–3.3)
LYMPHOCYTES # BLD AUTO: 5.2 % — LOW (ref 13–44)
MAGNESIUM SERPL-MCNC: 2.1 MG/DL — SIGNIFICANT CHANGE UP (ref 1.6–2.6)
MCHC RBC-ENTMCNC: 27.3 PG — SIGNIFICANT CHANGE UP (ref 27–34)
MCHC RBC-ENTMCNC: 31.7 GM/DL — LOW (ref 32–36)
MCV RBC AUTO: 86.2 FL — SIGNIFICANT CHANGE UP (ref 80–100)
MONOCYTES # BLD AUTO: 1.12 K/UL — HIGH (ref 0–0.9)
MONOCYTES NFR BLD AUTO: 6 % — SIGNIFICANT CHANGE UP (ref 2–14)
NEUTROPHILS # BLD AUTO: 16.45 K/UL — HIGH (ref 1.8–7.4)
NEUTROPHILS NFR BLD AUTO: 84.5 % — HIGH (ref 43–77)
PHOSPHATE SERPL-MCNC: 1.6 MG/DL — LOW (ref 2.5–4.5)
PLATELET # BLD AUTO: 487 K/UL — HIGH (ref 150–400)
POTASSIUM SERPL-MCNC: 4.1 MMOL/L — SIGNIFICANT CHANGE UP (ref 3.5–5.3)
POTASSIUM SERPL-SCNC: 4.1 MMOL/L — SIGNIFICANT CHANGE UP (ref 3.5–5.3)
PROTHROM AB SERPL-ACNC: 16.2 SEC — HIGH (ref 10.5–13.4)
RBC # BLD: 3.55 M/UL — LOW (ref 3.8–5.2)
RBC # FLD: 15.9 % — HIGH (ref 10.3–14.5)
SODIUM SERPL-SCNC: 147 MMOL/L — HIGH (ref 135–145)
VANCOMYCIN FLD-MCNC: 7 UG/ML — SIGNIFICANT CHANGE UP
WBC # BLD: 18.72 K/UL — HIGH (ref 3.8–10.5)
WBC # FLD AUTO: 18.72 K/UL — HIGH (ref 3.8–10.5)

## 2022-04-04 PROCEDURE — 99233 SBSQ HOSP IP/OBS HIGH 50: CPT

## 2022-04-04 PROCEDURE — 99223 1ST HOSP IP/OBS HIGH 75: CPT

## 2022-04-04 RX ORDER — VANCOMYCIN HCL 1 G
1000 VIAL (EA) INTRAVENOUS ONCE
Refills: 0 | Status: COMPLETED | OUTPATIENT
Start: 2022-04-04 | End: 2022-04-04

## 2022-04-04 RX ORDER — SODIUM CHLORIDE 9 MG/ML
1000 INJECTION, SOLUTION INTRAVENOUS
Refills: 0 | Status: DISCONTINUED | OUTPATIENT
Start: 2022-04-04 | End: 2022-04-05

## 2022-04-04 RX ORDER — SODIUM,POTASSIUM PHOSPHATES 278-250MG
1 POWDER IN PACKET (EA) ORAL ONCE
Refills: 0 | Status: COMPLETED | OUTPATIENT
Start: 2022-04-04 | End: 2022-04-04

## 2022-04-04 RX ADMIN — Medication 250 MILLIGRAM(S): at 07:02

## 2022-04-04 RX ADMIN — HEPARIN SODIUM 1400 UNIT(S)/HR: 5000 INJECTION INTRAVENOUS; SUBCUTANEOUS at 13:54

## 2022-04-04 RX ADMIN — Medication 1 PACKET(S): at 09:16

## 2022-04-04 RX ADMIN — HEPARIN SODIUM 1400 UNIT(S)/HR: 5000 INJECTION INTRAVENOUS; SUBCUTANEOUS at 23:11

## 2022-04-04 RX ADMIN — SODIUM CHLORIDE 75 MILLILITER(S): 9 INJECTION, SOLUTION INTRAVENOUS at 18:37

## 2022-04-04 RX ADMIN — HEPARIN SODIUM 1400 UNIT(S)/HR: 5000 INJECTION INTRAVENOUS; SUBCUTANEOUS at 19:30

## 2022-04-04 RX ADMIN — Medication 60 MICROGRAM(S): at 05:11

## 2022-04-04 RX ADMIN — HEPARIN SODIUM 1400 UNIT(S)/HR: 5000 INJECTION INTRAVENOUS; SUBCUTANEOUS at 05:11

## 2022-04-04 RX ADMIN — POLYETHYLENE GLYCOL 3350 17 GRAM(S): 17 POWDER, FOR SOLUTION ORAL at 13:55

## 2022-04-04 RX ADMIN — PIPERACILLIN AND TAZOBACTAM 25 GRAM(S): 4; .5 INJECTION, POWDER, LYOPHILIZED, FOR SOLUTION INTRAVENOUS at 05:11

## 2022-04-04 RX ADMIN — PANTOPRAZOLE SODIUM 40 MILLIGRAM(S): 20 TABLET, DELAYED RELEASE ORAL at 13:56

## 2022-04-04 RX ADMIN — PIPERACILLIN AND TAZOBACTAM 25 GRAM(S): 4; .5 INJECTION, POWDER, LYOPHILIZED, FOR SOLUTION INTRAVENOUS at 18:37

## 2022-04-04 NOTE — DIETITIAN INITIAL EVALUATION ADULT. - PERTINENT MEDS FT
MEDICATIONS  (STANDING):  heparin  Infusion. 1100 Unit(s)/Hr (11 mL/Hr) IV Continuous <Continuous>  lactated ringers. 1000 milliLiter(s) (75 mL/Hr) IV Continuous <Continuous>  levothyroxine Injectable 40 MICROGram(s) IV Push <User Schedule>  levothyroxine Injectable 60 MICROGram(s) IV Push <User Schedule>  pantoprazole  Injectable 40 milliGRAM(s) IV Push daily  piperacillin/tazobactam IVPB.. 3.375 Gram(s) IV Intermittent every 12 hours  polyethylene glycol 3350 17 Gram(s) Oral daily  senna 2 Tablet(s) Oral at bedtime  sodium chloride 0.45%. 1000 milliLiter(s) (75 mL/Hr) IV Continuous <Continuous>

## 2022-04-04 NOTE — CONSULT NOTE ADULT - PROBLEM SELECTOR RECOMMENDATION 9
Conservative management per prior GOC conversations with family  Currently pt appears comfortable   Can add low dose opioids if pain arises  Will address with family medical management in regards of abx and ac

## 2022-04-04 NOTE — DIETITIAN INITIAL EVALUATION ADULT. - OTHER INFO
89F hx of HTN, HLD, Hypothyroidism, dementia presenting from [facility] with acute limb ischemia and UTI.    Pt is A&Ox0. Collateral obtained from RN and PCA. Pt ate 25-50% of breakfast and lunch meals today. Pt was not able to participate in speech and swallow assessment. Per GOC, no TF. Pureed, mod thick liquids currently; RN denies any chewing or swallowing difficulties at this time.    Unable to obtain wt history at this time.

## 2022-04-04 NOTE — CONSULT NOTE ADULT - PROBLEM SELECTOR RECOMMENDATION 4
Pt is DNR/DNI --> by primary team  Pt has 4 adult chilren, designated person is Herve  11 AM meeting scheduled for tomorrow

## 2022-04-04 NOTE — DIETITIAN INITIAL EVALUATION ADULT. - PROBLEM SELECTOR PLAN 2
Most likely due to infectious and metabolic etiology   -C/w broad spectrum abx for limb ischemia/UTI and IVF for rhabdomyolysis   -CT head showed no acute finding   -Less likely due to toxic/medications  -Aspiration and seizure precautions   -DNI/DNR

## 2022-04-04 NOTE — CONSULT NOTE ADULT - SUBJECTIVE AND OBJECTIVE BOX
HPI:  89y F pmhx HTN HLD hypothyroidism presenting from [facility] with 1d progression left foot discoloration and pain. Unable to obtain HPI from patient due to acute encephalopathy. While at VS given heparin and 1L of fluids.    Collateral obtained from HCP  Herve Son- No previous blood clots. Prescribed abx Wednesday 3/30 for UTI. Previous to that time had diarrhea for about one week. Was listless and unresponsive about 1 week ago. Before that time, fluctuating mental status with "bad dementia" but in general with a personality and was able to walk with assistance and a walker and performed ADLs with assistance including performing transfers. Last few days with "challenging" po intake and in last 24h refusing food and drink. Prior to that time would eat. Has two aides at home. One is full time and the other one comes 4 hours a day. States that Wednesday 3/30 the aide was able to have mother singing. Nonverbal past two days. Last dose of abx prior to thursday, patient spit up the medication. Confirmed does not want surgical intervention at this time with acute limb ischemia. Would like antibiotics and IVF but no nasogastric tube or artificial feeding that would artificially prolong her life. States he would like her to "die a natural death" and that decision would be in adherence with patient wishes if she could otherwise make decisions for herself.  (01 Apr 2022 22:33)    PERTINENT PM/SXH:   Hypothyroid    Dementia    Hypercholesterolemia    No significant past surgical history    FAMILY HISTORY:  No pertinent family history in first degree relatives    ITEMS NOT CHECKED ARE NOT PRESENT    SOCIAL HISTORY:   Significant other/partner:  [ ]  Children:  [ x]  Mu-ism/Spirituality:  Substance hx:  [ ]   Tobacco hx:  [ ]   Alcohol hx: [ ]   Home Opioid hx:  [ ] I-Stop Reference No:  Living Situation: [ ]Home  [ ]Long term care  [ ]Rehab [ ]Other  Lives with 2 aides    ADVANCE DIRECTIVES:    DNR  MOLST  [ ]  Living Will  [ ]   DECISION MAKER(s):  [ ] Health Care Proxy(s)  [ x] Surrogate(s)  [ ] Guardian           Name(s): Phone Number(s):  Herve Núñez #[phone]    BASELINE (I)ADL(s) (prior to admission):  [place]: [ ]Total  [ ] Moderate [ x]Dependent  Ambulates with a walker and assistance at baseline  Requires assistance in all ADLs    Allergies    No Known Allergies    Intolerances    MEDICATIONS  (STANDING):  heparin  Infusion. 1100 Unit(s)/Hr (11 mL/Hr) IV Continuous <Continuous>  lactated ringers. 1000 milliLiter(s) (75 mL/Hr) IV Continuous <Continuous>  levothyroxine Injectable 40 MICROGram(s) IV Push <User Schedule>  levothyroxine Injectable 60 MICROGram(s) IV Push <User Schedule>  pantoprazole  Injectable 40 milliGRAM(s) IV Push daily  piperacillin/tazobactam IVPB.. 3.375 Gram(s) IV Intermittent every 12 hours  polyethylene glycol 3350 17 Gram(s) Oral daily  senna 2 Tablet(s) Oral at bedtime  sodium chloride 0.45%. 1000 milliLiter(s) (75 mL/Hr) IV Continuous <Continuous>    MEDICATIONS  (PRN):  heparin   Injectable 4000 Unit(s) IV Push every 6 hours PRN For aPTT less than 40  heparin   Injectable 2000 Unit(s) IV Push every 6 hours PRN For aPTT between 40 - 57  HYDROmorphone  Injectable 0.25 milliGRAM(s) IV Push every 4 hours PRN Moderate Pain (4 - 6)  HYDROmorphone  Injectable 0.5 milliGRAM(s) IV Push every 4 hours PRN Severe Pain (7 - 10)  ondansetron Injectable 4 milliGRAM(s) IV Push every 8 hours PRN Nausea and/or Vomiting    PRESENT SYMPTOMS: [ ]Unable to obtain due to poor mentation   Source if other than patient:  [ ]Family   [ ]Team     Pain (Impact on QOL):    Location -         Minimal acceptable level (0-10 scale):                    Aggravating factors -  Quality -  Radiation -  Severity (0-10 scale) -    Timing -    PAIN AD Score: 0    [email or web address] (press ctrl +  left click to view)    Dyspnea:                           [ ]Mild [ ]Moderate [ ]Severe  Anxiety:                             [ ]Mild [ ]Moderate [ ]Severe  Fatigue:                             [ ]Mild [ ]Moderate [ ]Severe  Nausea:                             [ ]Mild [ ]Moderate [ ]Severe  Loss of appetite:              [ ]Mild [ ]Moderate [ ]Severe  Constipation:                    [ ]Mild [ ]Moderate [ ]Severe    Other Symptoms:  [ ]All other review of systems negative     Karnofsky Performance Score/Palliative Performance Status Version 2:  30-40 %    http://palliative.info/resource_material/[email or web address].pdf    PHYSICAL EXAM:  Vital Signs Last 24 Hrs  T(C): 36.5 (04 Apr 2022 14:00), Max: 36.5 (04 Apr 2022 14:00)  T(F): 97.7 (04 Apr 2022 14:00), Max: 97.7 (04 Apr 2022 14:00)  HR: 90 (04 Apr 2022 14:00) (84 - 90)  BP: 125/64 (04 Apr 2022 14:00) (116/72 - 125/64)  BP(mean): --  RR: 17 (04 Apr 2022 14:00) (17 - 18)  SpO2: 96% (04 Apr 2022 14:00) (94% - 96%) I&O's Summary    03 Apr 2022 07:01  -  04 Apr 2022 07:00  --------------------------------------------------------  IN: 0 mL / OUT: 500 mL / NET: -500 mL    GENERAL:  [ ]Alert  [ ]Oriented x   [ ]Lethargic  [ ]Cachexia  [ ]Unarousable  [ ]Verbal  [ ]Non-Verbal  Behavioral:   [ ] Anxiety  [ x] Delirium [ ] Agitation [ ] Other  HEENT:  [ ]Normal   [ x]Dry mouth   [ ]ET Tube/Trach  [ ]Oral lesions  PULMONARY:   [ ]Clear [ ]Tachypnea  [ ]Audible excessive secretions   [ ]Rhonchi        [ ]Right [ ]Left [ ]Bilateral  [ ]Crackles        [ ]Right [ ]Left [ ]Bilateral  [ ]Wheezing     [ ]Right [ ]Left [ ]Bilateral  CARDIOVASCULAR:    [ ]Regular [ ]Irregular [ ]Tachy  [ ]Celio [ ]Murmur [ ]Other  GASTROINTESTINAL:  [ ]Soft  [ ]Distended   [ ]+BS  [ ]Non tender [ ]Tender  [ ]PEG [ ]OGT/ NGT  Last BM: GENITOURINARY:  [ ]Normal [ ] Incontinent   [ ]Oliguria/Anuria   [ ]Ga  MUSCULOSKELETAL:   [ ]Normal   [ ]Weakness  [ ]Bed/Wheelchair bound [ ]Edema  NEUROLOGIC:   [ ]No focal deficits  [ ] Cognitive impairment  [ ] Dysphagia [ ]Dysarthria [ ] Paresis [ ]Other   SKIN:   [ ]Normal   [ ]Pressure ulcer(s)  [ ]Rash    CRITICAL CARE:  [ ] Shock Present  [ ]Septic [ ]Cardiogenic [ ]Neurologic [ ]Hypovolemic  [ ]  Vasopressors [ ]  Inotropes   [ ] Respiratory failure present  [ ] Acute  [ ] Chronic [ ] Hypoxic  [ ] Hypercarbic [ ] Other  [ ] Other organ failure     GRIEF  [ ] Yes  [ ] No    LABS:                        9.7    18.72 )-----------( 487      ( 04 Apr 2022 04:36 )             30.6   04-04    147<H>  |  112<H>  |  15  ----------------------------<  107<H>  4.1   |  24  |  0.77    Ca    7.8<L>      04 Apr 2022 04:36  Phos  1.6     04-04  Mg     2.10     04-04    PT/INR - ( 04 Apr 2022 04:36 )   PT: 16.2 sec;   INR: 1.39 ratio         PTT - ( 04 Apr 2022 12:08 )  PTT:75.3 sec      RADIOLOGY & ADDITIONAL STUDIES:    PROTEIN CALORIE MALNUTRITION PRESENT: [ ] Yes [ ] No  [ ] PPSV2 < or = to 30% [ ] significant weight loss  [ ] poor nutritional intake [ ] catabolic state [ ] anasarca     Artificial Nutrition [ ]     REFERRALS:   [ ]Chaplaincy  [ ] Hospice  [ ]Child Life  [ ]Social Work  [ ]Case management [ ]Holistic Therapy   Goals of Care Discussion Document:  HPI:  89y F pmhx HTN HLD hypothyroidism presenting from [facility] with 1d progression left foot discoloration and pain. Unable to obtain HPI from patient due to acute encephalopathy. While at VS given heparin and 1L of fluids.    Collateral obtained from HCP  Herve Son- No previous blood clots. Prescribed abx Wednesday 3/30 for UTI. Previous to that time had diarrhea for about one week. Was listless and unresponsive about 1 week ago. Before that time, fluctuating mental status with "bad dementia" but in general with a personality and was able to walk with assistance and a walker and performed ADLs with assistance including performing transfers. Last few days with "challenging" po intake and in last 24h refusing food and drink. Prior to that time would eat. Has two aides at home. One is full time and the other one comes 4 hours a day. States that Wednesday 3/30 the aide was able to have mother singing. Nonverbal past two days. Last dose of abx prior to thursday, patient spit up the medication. Confirmed does not want surgical intervention at this time with acute limb ischemia. Would like antibiotics and IVF but no nasogastric tube or artificial feeding that would artificially prolong her life. States he would like her to "die a natural death" and that decision would be in adherence with patient wishes if she could otherwise make decisions for herself.  (01 Apr 2022 22:33)    PERTINENT PM/SXH:   Hypothyroid    Dementia    Hypercholesterolemia    No significant past surgical history    FAMILY HISTORY:  No pertinent family history in first degree relatives    ITEMS NOT CHECKED ARE NOT PRESENT    SOCIAL HISTORY:   Significant other/partner:  [ ]  Children:  [ x]  Latter day/Spirituality:  Substance hx:  [ ]   Tobacco hx:  [ ]   Alcohol hx: [ ]   Home Opioid hx:  [ ] I-Stop Reference No:  Living Situation: [ ]Home  [ ]Long term care  [ ]Rehab [ ]Other  Lives with 2 aides    ADVANCE DIRECTIVES:    DNR  MOLST  [ ]  Living Will  [ ]   DECISION MAKER(s):  [ ] Health Care Proxy(s)  [ x] Surrogate(s)  [ ] Guardian           Name(s): Phone Number(s):  Herve Núñez #[phone]    BASELINE (I)ADL(s) (prior to admission):  [place]: [ ]Total  [ ] Moderate [ x]Dependent  Ambulates with a walker and assistance at baseline  Requires assistance in all ADLs    Allergies    No Known Allergies    Intolerances    MEDICATIONS  (STANDING):  heparin  Infusion. 1100 Unit(s)/Hr (11 mL/Hr) IV Continuous <Continuous>  lactated ringers. 1000 milliLiter(s) (75 mL/Hr) IV Continuous <Continuous>  levothyroxine Injectable 40 MICROGram(s) IV Push <User Schedule>  levothyroxine Injectable 60 MICROGram(s) IV Push <User Schedule>  pantoprazole  Injectable 40 milliGRAM(s) IV Push daily  piperacillin/tazobactam IVPB.. 3.375 Gram(s) IV Intermittent every 12 hours  polyethylene glycol 3350 17 Gram(s) Oral daily  senna 2 Tablet(s) Oral at bedtime  sodium chloride 0.45%. 1000 milliLiter(s) (75 mL/Hr) IV Continuous <Continuous>    MEDICATIONS  (PRN):  heparin   Injectable 4000 Unit(s) IV Push every 6 hours PRN For aPTT less than 40  heparin   Injectable 2000 Unit(s) IV Push every 6 hours PRN For aPTT between 40 - 57  HYDROmorphone  Injectable 0.25 milliGRAM(s) IV Push every 4 hours PRN Moderate Pain (4 - 6)  HYDROmorphone  Injectable 0.5 milliGRAM(s) IV Push every 4 hours PRN Severe Pain (7 - 10)  ondansetron Injectable 4 milliGRAM(s) IV Push every 8 hours PRN Nausea and/or Vomiting    PRESENT SYMPTOMS: [x ]Unable to obtain due to poor mentation   Source if other than patient:  [ ]Family   [ ]Team     Pain (Impact on QOL):    Location -         Minimal acceptable level (0-10 scale):                    Aggravating factors -  Quality -  Radiation -  Severity (0-10 scale) -    Timing -    PAIN AD Score: 0    [email or web address] (press ctrl +  left click to view)    Dyspnea:                           [ ]Mild [ ]Moderate [ ]Severe  Anxiety:                             [ ]Mild [ ]Moderate [ ]Severe  Fatigue:                             [ ]Mild [ ]Moderate [ ]Severe  Nausea:                             [ ]Mild [ ]Moderate [ ]Severe  Loss of appetite:              [ ]Mild [ ]Moderate [ ]Severe  Constipation:                    [ ]Mild [ ]Moderate [ ]Severe    Other Symptoms:  [ ]All other review of systems negative     Karnofsky Performance Score/Palliative Performance Status Version 2:  30-40 %    http://palliative.info/resource_material/PPSv2.pdf    PHYSICAL EXAM:  Vital Signs Last 24 Hrs  T(C): 36.5 (04 Apr 2022 14:00), Max: 36.5 (04 Apr 2022 14:00)  T(F): 97.7 (04 Apr 2022 14:00), Max: 97.7 (04 Apr 2022 14:00)  HR: 90 (04 Apr 2022 14:00) (84 - 90)  BP: 125/64 (04 Apr 2022 14:00) (116/72 - 125/64)  BP(mean): --  RR: 17 (04 Apr 2022 14:00) (17 - 18)  SpO2: 96% (04 Apr 2022 14:00) (94% - 96%) I&O's Summary    03 Apr 2022 07:01  -  04 Apr 2022 07:00  --------------------------------------------------------  IN: 0 mL / OUT: 500 mL / NET: -500 mL    GENERAL:  [ ]Alert  [ ]Oriented x   [ ]Lethargic  [ ]Cachexia  [ ]Unarousable  [ ]Verbal  [x ]Non-Verbal  Behavioral:   [ ] Anxiety  [ x] Delirium [ ] Agitation [ ] Other  HEENT:  [ ]Normal   [ x]Dry mouth   [ ]ET Tube/Trach  [ ]Oral lesions  PULMONARY:   [ ]Clear [ ]Tachypnea  [ ]Audible excessive secretions   [ ]Rhonchi        [ ]Right [ ]Left [ ]Bilateral  [ ]Crackles        [ ]Right [ ]Left [ ]Bilateral  [ ]Wheezing     [ ]Right [ ]Left [ ]Bilateral  CARDIOVASCULAR:    [ x]Regular [ ]Irregular [ ]Tachy  [ ]Celio [ ]Murmur [ ]Other  GASTROINTESTINAL:  [ x]Soft  [ ]Distended   [x ]+BS  [x ]Non tender [ ]Tender  [ ]PEG [ ]OGT/ NGT  Last BM: 4/3/22  GENITOURINARY:  [ ]Normal [ ] Incontinent   [ ]Oliguria/Anuria   [ x]Ga  MUSCULOSKELETAL:   [ ]Normal   [ ]Weakness  [x ]Bed/Wheelchair bound [ ]Edema  NEUROLOGIC:   [ ]No focal deficits  [x ] Cognitive impairment  [ ] Dysphagia [ ]Dysarthria [ ] Paresis [ ]Other   SKIN: ischemic limb  [ ]Normal   [ ]Pressure ulcer(s)  [ ]Rash    CRITICAL CARE:  [ ] Shock Present  [ ]Septic [ ]Cardiogenic [ ]Neurologic [ ]Hypovolemic  [ ]  Vasopressors [ ]  Inotropes   [ ] Respiratory failure present  [ ] Acute  [ ] Chronic [ ] Hypoxic  [ ] Hypercarbic [ ] Other  [ ] Other organ failure     GRIEF  [ ] Yes  [ ] No    LABS:                        9.7    18.72 )-----------( 487      ( 04 Apr 2022 04:36 )             30.6   04-04    147<H>  |  112<H>  |  15  ----------------------------<  107<H>  4.1   |  24  |  0.77    Ca    7.8<L>      04 Apr 2022 04:36  Phos  1.6     04-04  Mg     2.10     04-04    PT/INR - ( 04 Apr 2022 04:36 )   PT: 16.2 sec;   INR: 1.39 ratio       PTT - ( 04 Apr 2022 12:08 )  PTT:75.3 sec    RADIOLOGY & ADDITIONAL STUDIES:  < from: CT Head No Cont (04.01.22 @ 14:48) >  IMPRESSION:  No evidence of acute intracranial abnormality.  No evidence of hemorrhage.    < from: CT Abdomen and Pelvis No Cont (04.01.22 @ 14:47) >  IMPRESSION:  Marked bladder distention with mild bilateral hydronephrosis and   hydroureter. See full discussion above.    PROTEIN CALORIE MALNUTRITION PRESENT: [ ] Yes [ ] No  [ ] PPSV2 < or = to 30% [ ] significant weight loss  [ ] poor nutritional intake [ ] catabolic state [ ] anasarca     Artificial Nutrition [ ]     REFERRALS:   [ ]Chaplaincy  [ ] Hospice  [ ]Child Life  [ ]Social Work  [ ]Case management [ ]Holistic Therapy   Goals of Care Discussion Document:

## 2022-04-04 NOTE — CONSULT NOTE ADULT - ASSESSMENT
89F hx of HTN, HLD, Hyperthyroidism who presents from [facility] with 1 day of progressive L foot redness and now purple discoloration and pain. Pt not responsive at time of evaluation. Per daughter, her brother visited pt this AM and noted left foot mottled (someone visits every day and now noted for the first time). Pt with likely acute limb ischemia.     - HCP understands risks/benefits of possible intervention vs comfort measures  - Family would like to proceed with palliative care  - No surgical intervention at this time  - Care per GOC discussion   - Discussed with Dr. Sreedhar Raymond PGY3   C Team Surgery   [id number] 
89y F pmhx HTN HLD hypothyroidism presenting from [facility] with 1d progression left foot discoloration and pain. Palliative Care consulted for complex decision making in the setting of advanced illness.

## 2022-04-04 NOTE — DIETITIAN INITIAL EVALUATION ADULT. - ORAL INTAKE PTA/DIET HISTORY
Per H&P, pt has been refusing food for 1 day PTA and poor PO a few days . Pt has two aides that help with ADLs.

## 2022-04-04 NOTE — DIETITIAN INITIAL EVALUATION ADULT. - PROBLEM SELECTOR PLAN 1
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 per goals of care lab draws and fluids are in line with patient wishes  -C/w broad spectrum abx-zosyn and vanco by level   -heparin gtt  -pain management with dilaudid 0.25mg q4h PRN moderate pain and dilaudid 0.5mg q4h PRN for severe pain  -If pain persists or changes in character low threshold to XR tib/fib and ankle.   -f/u  vascular surgery recommendations

## 2022-04-04 NOTE — CONSULT NOTE ADULT - CONSULT REASON
Left limb ischemia
acute limb ischemia
cli
Complex decision making in the setting of advanced illness

## 2022-04-04 NOTE — DIETITIAN INITIAL EVALUATION ADULT. - PROBLEM SELECTOR PLAN 4
On presentation with 2.34 with prior baseline 0.6-0.8 in 2019  Likely prerenal etiology in setting of poor intake 22 UTI as well as acute limb ischemia causing rhabdo and potential component of post-renal as had been retaining prior to arevalo placement found to have bilateral hydro  -urine lytes and repeat UA with microscopic analysis  -Fluids as above  -Arevalo in palce  -BMP q6h  -CK q6h monitoring for rhabdo  -NS @100 for 24h  -If renal failure continues to worsen will need to contact HCP and inquire if dialysis would be consistent with patient wishes.

## 2022-04-04 NOTE — DIETITIAN INITIAL EVALUATION ADULT. - PROBLEM SELECTOR PLAN 3
Patient was recent diagnosed with UTI  -C/w zosyn for now  -F/u with culture   -Possibly contributing to encephalopathy as well

## 2022-04-05 DIAGNOSIS — R53.2 FUNCTIONAL QUADRIPLEGIA: ICD-10-CM

## 2022-04-05 DIAGNOSIS — Z71.89 OTHER SPECIFIED COUNSELING: ICD-10-CM

## 2022-04-05 DIAGNOSIS — Z51.5 ENCOUNTER FOR PALLIATIVE CARE: ICD-10-CM

## 2022-04-05 LAB
ALBUMIN SERPL ELPH-MCNC: 2.3 G/DL — LOW (ref 3.3–5)
ANION GAP SERPL CALC-SCNC: 12 MMOL/L — SIGNIFICANT CHANGE UP (ref 7–14)
APTT BLD: 32.4 SEC — SIGNIFICANT CHANGE UP (ref 27–36.3)
BUN SERPL-MCNC: 12 MG/DL — SIGNIFICANT CHANGE UP (ref 7–23)
CALCIUM SERPL-MCNC: 7.1 MG/DL — LOW (ref 8.4–10.5)
CHLORIDE SERPL-SCNC: 107 MMOL/L — SIGNIFICANT CHANGE UP (ref 98–107)
CO2 SERPL-SCNC: 25 MMOL/L — SIGNIFICANT CHANGE UP (ref 22–31)
CREAT SERPL-MCNC: 0.66 MG/DL — SIGNIFICANT CHANGE UP (ref 0.5–1.3)
EGFR: 81 ML/MIN/1.73M2 — SIGNIFICANT CHANGE UP
GLUCOSE SERPL-MCNC: 103 MG/DL — HIGH (ref 70–99)
HCT VFR BLD CALC: 30.8 % — LOW (ref 34.5–45)
HGB BLD-MCNC: 10 G/DL — LOW (ref 11.5–15.5)
MAGNESIUM SERPL-MCNC: 2 MG/DL — SIGNIFICANT CHANGE UP (ref 1.6–2.6)
MCHC RBC-ENTMCNC: 27.5 PG — SIGNIFICANT CHANGE UP (ref 27–34)
MCHC RBC-ENTMCNC: 32.5 GM/DL — SIGNIFICANT CHANGE UP (ref 32–36)
MCV RBC AUTO: 84.6 FL — SIGNIFICANT CHANGE UP (ref 80–100)
NRBC # BLD: 0 /100 WBCS — SIGNIFICANT CHANGE UP
NRBC # FLD: 0.03 K/UL — HIGH
PHOSPHATE SERPL-MCNC: 2.6 MG/DL — SIGNIFICANT CHANGE UP (ref 2.5–4.5)
PLATELET # BLD AUTO: 695 K/UL — HIGH (ref 150–400)
POTASSIUM SERPL-MCNC: 3.2 MMOL/L — LOW (ref 3.5–5.3)
POTASSIUM SERPL-SCNC: 3.2 MMOL/L — LOW (ref 3.5–5.3)
RBC # BLD: 3.64 M/UL — LOW (ref 3.8–5.2)
RBC # FLD: 15.9 % — HIGH (ref 10.3–14.5)
SODIUM SERPL-SCNC: 144 MMOL/L — SIGNIFICANT CHANGE UP (ref 135–145)
VANCOMYCIN FLD-MCNC: 8.5 UG/ML — SIGNIFICANT CHANGE UP
WBC # BLD: 22 K/UL — HIGH (ref 3.8–10.5)
WBC # FLD AUTO: 22 K/UL — HIGH (ref 3.8–10.5)

## 2022-04-05 PROCEDURE — 99497 ADVNCD CARE PLAN 30 MIN: CPT | Mod: 25

## 2022-04-05 PROCEDURE — 99233 SBSQ HOSP IP/OBS HIGH 50: CPT

## 2022-04-05 PROCEDURE — 99497 ADVNCD CARE PLAN 30 MIN: CPT

## 2022-04-05 RX ORDER — HEPARIN SODIUM 5000 [USP'U]/ML
1300 INJECTION INTRAVENOUS; SUBCUTANEOUS
Qty: 25000 | Refills: 0 | Status: DISCONTINUED | OUTPATIENT
Start: 2022-04-05 | End: 2022-04-05

## 2022-04-05 RX ORDER — SODIUM CHLORIDE 9 MG/ML
1000 INJECTION, SOLUTION INTRAVENOUS
Refills: 0 | Status: DISCONTINUED | OUTPATIENT
Start: 2022-04-05 | End: 2022-04-06

## 2022-04-05 RX ORDER — MORPHINE SULFATE 50 MG/1
2.5 CAPSULE, EXTENDED RELEASE ORAL
Refills: 0 | Status: DISCONTINUED | OUTPATIENT
Start: 2022-04-05 | End: 2022-04-07

## 2022-04-05 RX ORDER — MORPHINE SULFATE 50 MG/1
1 CAPSULE, EXTENDED RELEASE ORAL
Refills: 0 | Status: DISCONTINUED | OUTPATIENT
Start: 2022-04-05 | End: 2022-04-07

## 2022-04-05 RX ORDER — POTASSIUM CHLORIDE 20 MEQ
40 PACKET (EA) ORAL ONCE
Refills: 0 | Status: COMPLETED | OUTPATIENT
Start: 2022-04-05 | End: 2022-04-05

## 2022-04-05 RX ADMIN — Medication 40 MICROGRAM(S): at 06:10

## 2022-04-05 RX ADMIN — HEPARIN SODIUM 1300 UNIT(S)/HR: 5000 INJECTION INTRAVENOUS; SUBCUTANEOUS at 10:42

## 2022-04-05 RX ADMIN — SODIUM CHLORIDE 50 MILLILITER(S): 9 INJECTION, SOLUTION INTRAVENOUS at 13:08

## 2022-04-05 RX ADMIN — PANTOPRAZOLE SODIUM 40 MILLIGRAM(S): 20 TABLET, DELAYED RELEASE ORAL at 13:10

## 2022-04-05 RX ADMIN — Medication 40 MILLIEQUIVALENT(S): at 10:31

## 2022-04-05 RX ADMIN — PIPERACILLIN AND TAZOBACTAM 25 GRAM(S): 4; .5 INJECTION, POWDER, LYOPHILIZED, FOR SOLUTION INTRAVENOUS at 06:06

## 2022-04-05 NOTE — GOALS OF CARE CONVERSATION - ADVANCED CARE PLANNING - CONVERSATION DETAILS
Met with pts 2 adult children, pts family is aware of pts current medical condition. Their goal is for pt to be comfortable and not suffer, to allow a natural passing, addressing any symptoms that might arise. Reviewed risks vs benefits of current medical interventions including IV hydration, blood draws, abx and AC. Decision made to d/c abx, blood draws and AC and lower rate of IVH.   Family in agreement with recommendation of hospice services, discussed hospice philosophy of care as well as treating any distressful symptoms with medications such as bzd and opioids.   Support and reassurance provided to family.  Hospice referral made.

## 2022-04-06 ENCOUNTER — TRANSCRIPTION ENCOUNTER (OUTPATIENT)
Age: 87
End: 2022-04-06

## 2022-04-06 LAB
CULTURE RESULTS: SIGNIFICANT CHANGE UP
CULTURE RESULTS: SIGNIFICANT CHANGE UP
SPECIMEN SOURCE: SIGNIFICANT CHANGE UP
SPECIMEN SOURCE: SIGNIFICANT CHANGE UP

## 2022-04-06 PROCEDURE — 99232 SBSQ HOSP IP/OBS MODERATE 35: CPT

## 2022-04-06 RX ORDER — SODIUM CHLORIDE 9 MG/ML
1000 INJECTION, SOLUTION INTRAVENOUS
Refills: 0 | Status: DISCONTINUED | OUTPATIENT
Start: 2022-04-06 | End: 2022-04-07

## 2022-04-06 RX ADMIN — MORPHINE SULFATE 2.5 MILLIGRAM(S): 50 CAPSULE, EXTENDED RELEASE ORAL at 12:33

## 2022-04-06 RX ADMIN — PANTOPRAZOLE SODIUM 40 MILLIGRAM(S): 20 TABLET, DELAYED RELEASE ORAL at 12:33

## 2022-04-06 RX ADMIN — MORPHINE SULFATE 2.5 MILLIGRAM(S): 50 CAPSULE, EXTENDED RELEASE ORAL at 13:03

## 2022-04-06 RX ADMIN — SODIUM CHLORIDE 50 MILLILITER(S): 9 INJECTION, SOLUTION INTRAVENOUS at 17:02

## 2022-04-06 NOTE — DISCHARGE NOTE PROVIDER - HOSPITAL COURSE
89 yo female w/ pmhx of HTN, HLD, Hypothyroidism, demential (AxOx0), transferred from [facility], present with acute encephalopathy. Found to have acute LLE ischemia, UTI, and rhabdomyolysis.     + Critical limb ischemia of left lower extremity.   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 Evaluated by vascular surgery and recommending intervention however HCP Herve says that he does not want invasive management. Discussed with vascular as well.   -family meeting held on 4/5/22. HCP wishes to follow comfort focused care.  -d/c heparin gtt and antibiotics  -Pain management with morphine PRN  - DNR/DNI; MOLST signed    + Acute encephalopathy.   · Most likely due to infectious and metabolic etiology   -DNI/DNR  -comfort focused care.    + Acute UTI.   - UA positive s/p zosyn for 3 days  -urine culture normal jim.    + EMPERATRIZ (acute kidney injury).   - On presentation with 2.34 with prior baseline 0.6-0.8 in 2019. Likely prerenal etiology in setting of poor intake 22 UTI as well as acute limb ischemia causing rhabdo and potential component of post-renal as had been retaining prior to arevalo placement found to have bilateral hydro.   - C/w arevalo for comfort care    + Secondary rhabdomyolysis.   ·  CK elevated likely 2/2 limb ischemia. CK downtrending. per GOC, no further blood work.    + Hypertension / HLD / Hypothyroidism  - comfort focused care, stopped peds as per hospice  - family does not desire NGT    + Dementia.   -PRN ativan for agitation.    + Functional quadriplegia.   - Bedbound; Needs assistance with all ADLs  - Skin care  - Frequent repositioning.   89 yo female w/ pmhx of HTN, HLD, Hypothyroidism, demential (AxOx0), transferred from [facility], present with acute encephalopathy. Found to have acute LLE ischemia, UTI, and rhabdomyolysis.     + Critical limb ischemia of left lower extremity.   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 Evaluated by vascular surgery and recommending intervention however HCP Herve says that he does not want invasive management. Discussed with vascular as well.   -family meeting held on 4/5/22. HCP wishes to follow comfort focused care.  -d/c heparin gtt and antibiotics  -Pain management with morphine PRN  - DNR/DNI; MOLST signed    + Acute encephalopathy.   · Most likely due to infectious and metabolic etiology   -DNI/DNR  -comfort focused care.    + Acute UTI.   - UA positive s/p zosyn for 3 days  -urine culture normal jim.    + EMPERATRIZ (acute kidney injury).   - On presentation with 2.34 with prior baseline 0.6-0.8 in 2019. Likely prerenal etiology in setting of poor intake 22 UTI as well as acute limb ischemia causing rhabdo and potential component of post-renal as had been retaining prior to arevalo placement found to have bilateral hydro.   - C/w arevalo for comfort care    + Secondary rhabdomyolysis.   ·  CK elevated likely 2/2 limb ischemia. CK downtrending. per GOC, no further blood work.    + Hypertension / HLD / Hypothyroidism  - comfort focused care, stopped peds as per hospice  - family does not desire NGT    + Dementia.   -PRN ativan for agitation.    + Functional quadriplegia.   - Bedbound; Needs assistance with all ADLs  - Skin care  - Frequent repositioning.    Cleared for discharge to home hospice on -- 89 yo female w/ pmhx of HTN, HLD, Hypothyroidism, demential (AxOx0), transferred from [facility], present with acute encephalopathy. Found to have acute LLE ischemia, UTI, and rhabdomyolysis.     + Critical limb ischemia of left lower extremity.   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 Evaluated by vascular surgery and recommending intervention however HCP Herve says that he does not want invasive management. Discussed with vascular as well.   -family meeting held on 4/5/22. HCP wishes to follow comfort focused care.  -d/c heparin gtt and antibiotics  -Pain management with morphine PRN  - DNR/DNI; MOLST signed    + Acute encephalopathy.   · Most likely due to infectious and metabolic etiology   -DNI/DNR  -comfort focused care.    + Acute UTI.   - UA positive s/p zosyn for 3 days  -urine culture normal jim.    + EMPERATRIZ (acute kidney injury).   - On presentation with 2.34 with prior baseline 0.6-0.8 in 2019. Likely prerenal etiology in setting of poor intake 22 UTI as well as acute limb ischemia causing rhabdo and potential component of post-renal as had been retaining prior to arevalo placement found to have bilateral hydro.   - C/w arevalo for comfort care    + Secondary rhabdomyolysis.   ·  CK elevated likely 2/2 limb ischemia. CK downtrending. per GOC, no further blood work.    On 4/7/22, case was discussed with , patient is medically cleared and optimized for discharge today. All medications were reviewed with attending, and sent to mutually agreed upon pharmacy.     + Hypertension / HLD / Hypothyroidism  - comfort focused care, stopped peds as per hospice  - family does not desire NGT    + Dementia.   -PRN ativan for agitation.    + Functional quadriplegia.   - Bedbound; Needs assistance with all ADLs  - Skin care  - Frequent repositioning.    Cleared for discharge to home hospice on --

## 2022-04-06 NOTE — DISCHARGE NOTE PROVIDER - NSDCFUADDINST_GEN_ALL_CORE_FT
Ga catheter - Please change Q4 weeks or as needed.   Ga care per protocol: drainage bag below the level of bladder, keep it off the floor. Keep catheter secured to thigh. Clean/Shower daily to keep catheter clean.

## 2022-04-06 NOTE — DISCHARGE NOTE PROVIDER - NSDCCPCAREPLAN_GEN_ALL_CORE_FT
PRINCIPAL DISCHARGE DIAGNOSIS  Diagnosis: Critical limb ischemia of left lower extremity  Assessment and Plan of Treatment:       SECONDARY DISCHARGE DIAGNOSES  Diagnosis: Acute UTI  Assessment and Plan of Treatment:     Diagnosis: Hyperlipidemia  Assessment and Plan of Treatment:     Diagnosis: Hypothyroidism  Assessment and Plan of Treatment:     Diagnosis: EMPERATRIZ (acute kidney injury)  Assessment and Plan of Treatment:     Diagnosis: Dementia  Assessment and Plan of Treatment:     Diagnosis: Functional quadriplegia  Assessment and Plan of Treatment:     Diagnosis: Hypertension  Assessment and Plan of Treatment:      PRINCIPAL DISCHARGE DIAGNOSIS  Diagnosis: Critical limb ischemia of left lower extremity  Assessment and Plan of Treatment: You were found to have complete occlusion of your left proximal peroneal and proximal posterior tibial arteries. You were evaluated by vascular surgery and they recommended surgical intervention, however, your family has opted for conservative measures. You will be discharged to home with hospice services.         SECONDARY DISCHARGE DIAGNOSES  Diagnosis: Acute UTI  Assessment and Plan of Treatment: You were treated for a urinary tract infection for 3 days. You no longer require antibiotics.     PRINCIPAL DISCHARGE DIAGNOSIS  Diagnosis: Critical limb ischemia of left lower extremity  Assessment and Plan of Treatment: You were found to have complete occlusion of your left proximal peroneal and proximal posterior tibial arteries. You were evaluated by vascular surgery and they recommended surgical intervention, however, your family has opted for conservative measures. You will be discharged to home with hospice services.         SECONDARY DISCHARGE DIAGNOSES  Diagnosis: Acute UTI  Assessment and Plan of Treatment: You were treated for a urinary tract infection for 3 days. You no longer require antibiotics.    Diagnosis: Hospice care  Assessment and Plan of Treatment: You are being discharged home with Hospice services and it is recommended to focus on comfort measures and supportive care. Continue with medications prescribed for pain and other symptom control. If you have questions or concerns you may contact the Hospice service line by calling [phone].

## 2022-04-06 NOTE — DISCHARGE NOTE PROVIDER - NSDCMRMEDTOKEN_GEN_ALL_CORE_FT
atorvastatin 40 mg oral tablet: 1 tab(s) orally once a day  cefuroxime 250 mg oral tablet: 1 tab(s) orally every 12 hours. Took 3/30 dose did not tolerate 3/31  gabapentin 600 mg oral tablet: 1.5 tab(s) orally once a day (at bedtime)  levothyroxine 25 mcg (0.025 mg) oral tablet: 1 tab(s) orally Monday, Wednesday, and Friday  levothyroxine 50 mcg (0.05 mg) oral tablet: 1 tab(s) orally once a day  pantoprazole 40 mg oral delayed release tablet: 1 tab(s) orally once a day  QUEtiapine 50 mg oral tablet: 1 tab(s) orally once a day (in the morning)  QUEtiapine 50 mg oral tablet: 3 tab(s) orally once a day (in the evening)   morphine 20 mg/mL oral concentrate: 0.125 milliliter(s) orally every 3 hours MDD:1 ml   Dulcolax Laxative 10 mg rectal suppository: 1 suppository(ies) rectally once a day as needed   morphine 20 mg/mL oral concentrate: 0.125 milliliter(s) orally every 3 hours MDD:1 ml

## 2022-04-06 NOTE — CHART NOTE - NSCHARTNOTEFT_GEN_A_CORE
Plan is for pt to go home with hospice services tomorrow  [facility] liaison following  Page for uncontrolled symptoms [id number]

## 2022-04-07 ENCOUNTER — TRANSCRIPTION ENCOUNTER (OUTPATIENT)
Age: 87
End: 2022-04-07

## 2022-04-07 VITALS
SYSTOLIC BLOOD PRESSURE: 141 MMHG | DIASTOLIC BLOOD PRESSURE: 85 MMHG | HEART RATE: 85 BPM | RESPIRATION RATE: 18 BRPM | TEMPERATURE: 98 F | OXYGEN SATURATION: 95 %

## 2022-04-07 PROCEDURE — 99239 HOSP IP/OBS DSCHRG MGMT >30: CPT

## 2022-04-07 RX ORDER — GABAPENTIN 400 MG/1
1.5 CAPSULE ORAL
Qty: 0 | Refills: 0 | DISCHARGE

## 2022-04-07 RX ORDER — PANTOPRAZOLE SODIUM 20 MG/1
1 TABLET, DELAYED RELEASE ORAL
Qty: 0 | Refills: 0 | DISCHARGE

## 2022-04-07 RX ORDER — LEVOTHYROXINE SODIUM 125 MCG
1 TABLET ORAL
Qty: 0 | Refills: 0 | DISCHARGE

## 2022-04-07 RX ORDER — QUETIAPINE FUMARATE 200 MG/1
1 TABLET, FILM COATED ORAL
Qty: 0 | Refills: 0 | DISCHARGE

## 2022-04-07 RX ORDER — ATORVASTATIN CALCIUM 80 MG/1
1 TABLET, FILM COATED ORAL
Qty: 0 | Refills: 0 | DISCHARGE

## 2022-04-07 RX ORDER — QUETIAPINE FUMARATE 200 MG/1
3 TABLET, FILM COATED ORAL
Qty: 0 | Refills: 0 | DISCHARGE

## 2022-04-07 RX ORDER — MORPHINE SULFATE 50 MG/1
0.12 CAPSULE, EXTENDED RELEASE ORAL
Qty: 5 | Refills: 0
Start: 2022-04-07 | End: 2022-04-11

## 2022-04-07 RX ADMIN — MORPHINE SULFATE 2.5 MILLIGRAM(S): 50 CAPSULE, EXTENDED RELEASE ORAL at 10:48

## 2022-04-07 RX ADMIN — MORPHINE SULFATE 2.5 MILLIGRAM(S): 50 CAPSULE, EXTENDED RELEASE ORAL at 16:14

## 2022-04-07 RX ADMIN — MORPHINE SULFATE 2.5 MILLIGRAM(S): 50 CAPSULE, EXTENDED RELEASE ORAL at 11:18

## 2022-04-07 RX ADMIN — MORPHINE SULFATE 2.5 MILLIGRAM(S): 50 CAPSULE, EXTENDED RELEASE ORAL at 15:44

## 2022-04-07 RX ADMIN — PANTOPRAZOLE SODIUM 40 MILLIGRAM(S): 20 TABLET, DELAYED RELEASE ORAL at 10:48

## 2022-04-07 NOTE — PROGRESS NOTE ADULT - PROBLEM SELECTOR PLAN 10
-no a/c, sticks  -comfort focused care
-no a/c, sticks  -comfort focused care
AC heparin gtt  Diet NPO, speech and swallow - could not properly evaluate. As per family, would not want NGT or PEG for feeding. Palliative consulted for AM for further discussions.   Dispo Pending palliative care consult
AC heparin gtt  Diet NPO, speech and swallow - could not properly evaluate. As per family, would not want NGT or PEG for feeding. Palliative consulted for further discussions.   Dispo Pending palliative care consult
-no a/c, sticks  -comfort focused care  hospice referral
AC heparin gtt  Diet NPO, speech and swallow once safe to swallow  Dispo Pending palliative care consult

## 2022-04-07 NOTE — PROGRESS NOTE ADULT - PROBLEM SELECTOR PLAN 12
Bedbound  Needs assistance with all ADLs  Skin care  Frequent repositioning.

## 2022-04-07 NOTE — PROGRESS NOTE ADULT - PROBLEM SELECTOR PROBLEM 4
EMPERATRIZ (acute kidney injury)
EMPERATRIZ (acute kidney injury)
Palliative care encounter
EMPERATRIZ (acute kidney injury)

## 2022-04-07 NOTE — PROGRESS NOTE ADULT - PROBLEM SELECTOR PLAN 6
No home anti-hypertensives  continue to monitor VS

## 2022-04-07 NOTE — PROGRESS NOTE ADULT - PROBLEM SELECTOR PLAN 1
Complete occlusion of left proximal peroneal and prox posterior tibial arteries. Evaluated by vascular surgery and recommending intervention however HCP Herve (discussed over the phone on Sat 4/2) says that he does not want invasive management. Discussed with vascular as well.   - C/w hep gtt for now.   - Continue to monitor electrolytes high risk to enter rhabdomyolysis. per goals of care lab draws and fluids are in line with patient wishes  -C/w broad spectrum abx-zosyn and vanco by level   -Pain management with dilaudid 0.25mg q4h PRN moderate pain and dilaudid 0.5mg q4h PRN for severe pain  -If pain persists or changes in character low threshold to XR tib/fib and ankle.   -f/u  vascular surgery recommendations
Complete occlusion of left proximal peroneal and prox posterior tibial arteries. Evaluated by vascular surgery and recommending intervention however HCP Herve says that he does not want invasive management. Discussed with vascular as well.   - C/w hep gtt for now.   - Continue to monitor electrolytes high risk to enter rhabdomyolysis. per goals of care lab draws and fluids are in line with patient wishes  -C/w broad spectrum abx with zosyn  -Pain management with dilaudid 0.25mg q4h PRN moderate pain and dilaudid 0.5mg q4h PRN for severe pain  -If pain persists or changes in character low threshold to XR tib/fib and ankle.   -f/u  vascular surgery recommendations
Complete occlusion of left proximal peroneal and prox posterior tibial arteries. Evaluated by vascular surgery and recommending intervention however HCP Herve says that he does not want invasive management. Discussed with vascular as well.   -family meeting held on 4/5/22. HCP wishes to follow comfort focused care.  -Will stop heparin gtt and antibiotics  -Pain management with morphine PRN  -Dc to home hospice on 4/7
Conservative management   After GOC conversation - See GOC note, pt off abx and AC  Would recommend:  - Add Morphine concentrate 2.5mg sl q3hrs PRN for moderate pain  - Add Morphine 1mg IV q2hrs PRN for severe pain/discomfort  - Bowel regimen to prevent opioid induced constipation
Complete occlusion of left proximal peroneal and prox posterior tibial arteries. Evaluated by vascular surgery and recommending intervention however HCP Herve says that he does not want invasive management. Discussed with vascular as well.   -family meeting held today. HCP wishes to follow comfort focused care.  -Will stop heparin gtt and antibiotics  -Pain management with morphine PRN  -hospice referral placed
Complete occlusion of left proximal peroneal and prox posterior tibial arteries. Evaluated by vascular surgery and recommending intervention however HCP Herve says that he does not want invasive management. Discussed with vascular as well.   -family meeting held on 4/5/22. HCP wishes to follow comfort focused care.  -Will stop heparin gtt and antibiotics  -Pain management with morphine PRN  -Dc to home hospice on 4/7
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 per goals of care lab draws and fluids are in line with patient wishes  -C/w broad spectrum abx-zosyn and vanco by level   -heparin gtt  -pain management with dilaudid 0.25mg q4h PRN moderate pain and dilaudid 0.5mg q4h PRN for severe pain  -If pain persists or changes in character low threshold to XR tib/fib and ankle.   -f/u  vascular surgery recommendations

## 2022-04-07 NOTE — PROGRESS NOTE ADULT - PROBLEM SELECTOR PROBLEM 2
Acute encephalopathy
Acute encephalopathy
Dementia
Acute encephalopathy

## 2022-04-07 NOTE — PROVIDER CONTACT NOTE (OTHER) - ACTION/TREATMENT ORDERED:
Keep npo, formal speech and swallow ordered
Provider aware. Follow heparin nomogram. Continue to monitor
Notified Provider. 4L nasal cannula. Will continue to monitor

## 2022-04-07 NOTE — PROGRESS NOTE ADULT - ASSESSMENT
89y F pmhx HTN HLD hypothyroidism presenting from [facility] with 1d progression left foot discoloration and pain. Palliative Care consulted for complex decision making in the setting of advanced illness. 
89F hx of HTN, HLD, Hypothyroidism, dementia presenting from [facility] with acute limb ischemia and UTI. 

## 2022-04-07 NOTE — PROGRESS NOTE ADULT - PROBLEM SELECTOR PROBLEM 5
Secondary rhabdomyolysis

## 2022-04-07 NOTE — PROGRESS NOTE ADULT - PROBLEM SELECTOR PLAN 8
HOLD home seroquel and gabapentin patient is altered and cannot take po
-PRN ativan for agitation

## 2022-04-07 NOTE — PROVIDER CONTACT NOTE (OTHER) - BACKGROUND
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
Pt admitted for atherosclerosis of native artery of LLE

## 2022-04-07 NOTE — DISCHARGE NOTE NURSING/CASE MANAGEMENT/SOCIAL WORK - PATIENT PORTAL LINK FT
You can access the FollowMyHealth Patient Portal offered by [facility] by registering at the following website: [email or web address]. By joining [facility]’s FollowMyHealth portal, you will also be able to view your health information using other applications (apps) compatible with our system.

## 2022-04-07 NOTE — DISCHARGE NOTE NURSING/CASE MANAGEMENT/SOCIAL WORK - NSDCPEFALRISK_GEN_ALL_CORE
For information on Fall & Injury Prevention, visit: [email or web address] OR  [email or web address] OR  https://www.cdc.gov/steadi/patient.html

## 2022-04-07 NOTE — PROGRESS NOTE ADULT - PROVIDER SPECIALTY LIST ADULT
Hospitalist
Cardiology
Palliative Care
Cardiology
Hospitalist

## 2022-04-07 NOTE — PROGRESS NOTE ADULT - PROBLEM SELECTOR PLAN 2
Most likely due to infectious and metabolic etiology   -DNI/DNR  -comfort focused care
Most likely due to infectious and metabolic etiology   -C/w broad spectrum abx for limb ischemia/UTI and IVF for rhabdomyolysis   -CT head showed no acute finding   -Less likely due to toxic/medications  -Aspiration and seizure precautions   -DNI/DNR
FAST>7C  Pt already had [facility] for her care, dependent on most ADLs  Currently with significant decline  Hospice appropriate, after GOC conversations with family, goal is comfort with home hospice  - Add Ativan 0.5mg PO q6hrs PRN for agitation
Most likely due to infectious and metabolic etiology   -DNI/DNR  -comfort focused care
Most likely due to infectious and metabolic etiology   -C/w broad spectrum abx for limb ischemia/UTI and IVF for rhabdomyolysis   -CT head showed no acute finding   -Less likely due to toxic/medications  -Aspiration and seizure precautions   -DNI/DNR
Most likely due to infectious and metabolic etiology   -DNI/DNR  -comfort focused care
Most likely due to infectious and metabolic etiology   -C/w broad spectrum abx for limb ischemia/UTI and IVF for rhabdomyolysis   -CT head showed no acute finding   -Less likely due to toxic/medications  -Aspiration and seizure precautions   -DNI/DNR

## 2022-04-07 NOTE — PROGRESS NOTE ADULT - PROBLEM SELECTOR PLAN 9
comfort focused care  can stop meds at this time   hospice referral
Levothyroxine 50mg and 75mg on alternating days  HOLD po levothyroxine as no NGT desired and cannot safely swallow  IV synthroid ordered
Levothyroxine 50mg and 75mg on alternating days  HOLD po levothyroxine as no NGT desired and cannot safely swallow  IV synthroid ordered
Levothyroxine 50mg and 75mg on alternating days  HOLD po levothyroxine as no NGT desired and cannot safely swallow
comfort focused care  can stop meds at this time   hospice
comfort focused care  can stop meds at this time   hospice

## 2022-04-07 NOTE — PROVIDER CONTACT NOTE (OTHER) - ASSESSMENT
No signs or symptoms. Pt resting comfortably in bed
Vital: BP:148/91 heart rate 95 temperature 97.7.

## 2022-04-07 NOTE — PROGRESS NOTE ADULT - PROBLEM SELECTOR PLAN 4
On presentation with 2.34 with prior baseline 0.6-0.8 in 2019. Likely prerenal etiology in setting of poor intake 22 UTI as well as acute limb ischemia causing rhabdo and potential component of post-renal as had been retaining prior to arevalo placement found to have bilateral hydro.   - C/w arevalo for comfort  -no further blood work monitoring  -comfort focused care
On presentation with 2.34 with prior baseline 0.6-0.8 in 2019. Likely prerenal etiology in setting of poor intake 22 UTI as well as acute limb ischemia causing rhabdo and potential component of post-renal as had been retaining prior to arevalo placement found to have bilateral hydro.   - C/w arevalo for now   - BMP q12 hours to monitor electrolytes and CK  - C/w maintenance fluids
Pt is DNR/DNI --> by primary team  Pt has 4 adult children, designated person is Herve  After GOC conversations, see GOC note, goal is comfort and home with hospice services  Will continue to follow  Page for uncontrolled symptoms [id number]
On presentation with 2.34 with prior baseline 0.6-0.8 in 2019  Likely prerenal etiology in setting of poor intake 22 UTI as well as acute limb ischemia causing rhabdo and potential component of post-renal as had been retaining prior to arevalo placement found to have bilateral hydro  -urine lytes and repeat UA with microscopic analysis  -Fluids as above  -Arevalo in palce  -BMP q6h  -CK q6h monitoring for rhabdo  -NS @100 for 24h  -If renal failure continues to worsen will need to contact HCP and inquire if dialysis would be consistent with patient wishes.
On presentation with 2.34 with prior baseline 0.6-0.8 in 2019. Likely prerenal etiology in setting of poor intake 22 UTI as well as acute limb ischemia causing rhabdo and potential component of post-renal as had been retaining prior to arevalo placement found to have bilateral hydro.   - C/w arevalo for now   - BMP q12 hours to monitor electrolytes and CK  - C/w maintenance fluids
On presentation with 2.34 with prior baseline 0.6-0.8 in 2019. Likely prerenal etiology in setting of poor intake 22 UTI as well as acute limb ischemia causing rhabdo and potential component of post-renal as had been retaining prior to arevalo placement found to have bilateral hydro.   - C/w arevalo for comfort  -no further blood work monitoring  -comfort focused care
On presentation with 2.34 with prior baseline 0.6-0.8 in 2019. Likely prerenal etiology in setting of poor intake 22 UTI as well as acute limb ischemia causing rhabdo and potential component of post-renal as had been retaining prior to arevalo placement found to have bilateral hydro.   - C/w arevalo for comfort  -no further blood work monitoring  -comfort focused care

## 2022-04-07 NOTE — PROGRESS NOTE ADULT - PROBLEM SELECTOR PROBLEM 1
Critical limb ischemia of left lower extremity

## 2022-04-07 NOTE — DISCHARGE NOTE NURSING/CASE MANAGEMENT/SOCIAL WORK - HAS THE PATIENT USED TOBACCO IN THE PAST 30 DAYS?
D/W SUSANNA, ok to write note regarding off work until result of cardiac cath. Note provided to pt in lobby  
Unable to assess due to patient's cognitive impairment

## 2022-04-07 NOTE — PROGRESS NOTE ADULT - SUBJECTIVE AND OBJECTIVE BOX
Hospitalist Progress Note  Dr. Kathryn Infante    OVERNIGHT EVENTS: ALLY    SUBJECTIVE / INTERVAL HPI: Patient seen and examined at bedside. Pt. with dementia A&Ox 0 and non-verbal. ROS not obtained. Updates provided to son over the phone     Vital Signs Last 24 Hrs  T(C): 36.7 (2022 12:25), Max: 36.7 (2022 12:25)  T(F): 98 (2022 12:25), Max: 98 (2022 12:25)  HR: 93 (2022 12:25) (93 - 97)  BP: 137/89 (2022 12:25) (137/89 - 139/86)  BP(mean): --  RR: 18 (2022 12:25) (18 - 18)  SpO2: 91% (2022 12:25) (91% - 95%)    PHYSICAL EXAM:    General: Elderly female - resting in bed - calm  HEENT: NC/AT; PERRL, anicteric sclera; MMM  Neck: supple  Cardiovascular: +S1/S2; RRR  Respiratory: CTA B/L; no W/R/R  Gastrointestinal: soft, NT/ND; +BSx4  Extremities: Left lower extremity cold and blue appearing with no pulses the lower extremity. Mildly tender to palpation. RLQ wnl.   Vascular: 2+ radial, DP/PT pulses B/L  Neurological: AAOx3; no focal deficits    MEDICATIONS:  MEDICATIONS  (STANDING):  heparin  Infusion. 1100 Unit(s)/Hr (11 mL/Hr) IV Continuous <Continuous>  lactated ringers. 1000 milliLiter(s) (75 mL/Hr) IV Continuous <Continuous>  levothyroxine Injectable 40 MICROGram(s) IV Push <User Schedule>  levothyroxine Injectable 60 MICROGram(s) IV Push <User Schedule>  pantoprazole  Injectable 40 milliGRAM(s) IV Push daily  piperacillin/tazobactam IVPB.. 3.375 Gram(s) IV Intermittent every 12 hours  polyethylene glycol 3350 17 Gram(s) Oral daily  senna 2 Tablet(s) Oral at bedtime  sodium chloride 0.45%. 1000 milliLiter(s) (75 mL/Hr) IV Continuous <Continuous>    MEDICATIONS  (PRN):  heparin   Injectable 4000 Unit(s) IV Push every 6 hours PRN For aPTT less than 40  heparin   Injectable 2000 Unit(s) IV Push every 6 hours PRN For aPTT between 40 - 57  HYDROmorphone  Injectable 0.25 milliGRAM(s) IV Push every 4 hours PRN Moderate Pain (4 - 6)  HYDROmorphone  Injectable 0.5 milliGRAM(s) IV Push every 4 hours PRN Severe Pain (7 - 10)  ondansetron Injectable 4 milliGRAM(s) IV Push every 8 hours PRN Nausea and/or Vomiting      ALLERGIES:  Allergies    No Known Allergies    Intolerances        LABS:                                   10.0   22.00 )-----------( 695      ( 2022 08:14 )             30.8   04-05    144  |  107  |  12  ----------------------------<  103<H>  3.2<L>   |  25  |  0.66    Ca    7.1<L>      2022 08:14  Phos  2.6     04-05  Mg     2.00     04-05    TPro  x   /  Alb  2.3<L>  /  TBili  x   /  DBili  x   /  AST  x   /  ALT  x   /  AlkPhos  x   04-05          Urinalysis Basic - ( 2022 00:06 )    Color: Light Orange / Appearance: Slightly Turbid / S.018 / pH: x  Gluc: x / Ketone: Negative  / Bili: Negative / Urobili: <2 mg/dL   Blood: x / Protein: 100 mg/dL / Nitrite: Positive   Leuk Esterase: Large / RBC: 11 /HPF /  /HPF   Sq Epi: x / Non Sq Epi: 1 /HPF / Bacteria: Few      CAPILLARY BLOOD GLUCOSE          RADIOLOGY & ADDITIONAL TESTS: Reviewed.    ASSESSMENT:    PLAN: 
Cardiovascular Disease Progress Note    Overnight events: No acute events overnight.  no new cardiac sx  Otherwise review of systems negative    Objective Findings:  T(C): 36.3 (04-04-22 @ 21:49), Max: 36.5 (04-04-22 @ 14:00)  HR: 97 (04-04-22 @ 21:49) (90 - 97)  BP: 121/79 (04-04-22 @ 21:49) (121/79 - 125/64)  RR: 17 (04-04-22 @ 21:49) (17 - 17)  SpO2: 91% (04-04-22 @ 21:49) (91% - 96%)  Wt(kg): --  Daily     Daily       Physical Exam:  Gen: NAD  HEENT: EOMI  CV: RRR, normal S1 + S2, 2/6 sanchez  Lungs: CTAB  Abd: soft, non-tender  Ext: No edema    Telemetry:    Laboratory Data:                        9.7    18.72 )-----------( 487      ( 04 Apr 2022 04:36 )             30.6     04-04    147<H>  |  112<H>  |  15  ----------------------------<  107<H>  4.1   |  24  |  0.77    Ca    7.8<L>      04 Apr 2022 04:36  Phos  1.6     04-04  Mg     2.10     04-04      PT/INR - ( 04 Apr 2022 04:36 )   PT: 16.2 sec;   INR: 1.39 ratio         PTT - ( 04 Apr 2022 20:06 )  PTT:67.6 sec  CARDIAC MARKERS ( 04 Apr 2022 04:36 )  x     / x     / 3914 U/L / x     / x              Inpatient Medications:  MEDICATIONS  (STANDING):  heparin  Infusion. 1100 Unit(s)/Hr (11 mL/Hr) IV Continuous <Continuous>  lactated ringers. 1000 milliLiter(s) (75 mL/Hr) IV Continuous <Continuous>  levothyroxine Injectable 40 MICROGram(s) IV Push <User Schedule>  levothyroxine Injectable 60 MICROGram(s) IV Push <User Schedule>  pantoprazole  Injectable 40 milliGRAM(s) IV Push daily  piperacillin/tazobactam IVPB.. 3.375 Gram(s) IV Intermittent every 12 hours      Assessment:  acute limb ischemia  UTI  encephalopathy  EMPERATRIZ  HLD  dementia  hypothyroid    Recs:  cardiac status stable  no e/o chf or cardiac ischemia  IV fluids to maintain euvolemic  abx per primary team  f/u vasc recs. likely conservative management for now  palliative evaluation  arevalo in place for bladder decompression  pain control  f/u pal care recs  dvt ppx            Over 25 minutes spent on total encounter; more than 50% of the visit was spent counseling and/or coordinating care by the attending physician.      Fabiano Joyce MD   Cardiovascular Disease  [phone]
Hospitalist Progress Note  Dr. Kathryn Infante    OVERNIGHT EVENTS: ALLY    SUBJECTIVE / INTERVAL HPI: Patient seen and examined at bedside. Pt. with dementia A&Ox 0 and non-verbal. ROS not obtained. Updates provided to son over the phone     Vital Signs Last 24 Hrs  T(C): 36.7 (2022 05:32), Max: 36.7 (2022 05:32)  T(F): 98 (2022 05:32), Max: 98 (2022 05:32)  HR: 88 (2022 05:32) (88 - 95)  BP: 142/90 (2022 05:32) (142/90 - 148/91)  BP(mean): --  RR: 20 (2022 08:45) (18 - 22)  SpO2: 94% (2022 08:45) (90% - 99%)    PHYSICAL EXAM:    General: Elderly female - resting in bed - calm  HEENT: NC/AT; PERRL, anicteric sclera; MMM  Neck: supple  Cardiovascular: +S1/S2; RRR  Respiratory: CTA B/L; no W/R/R  Gastrointestinal: soft, NT/ND; +BSx4  Extremities: Left lower extremity cold and blue appearing with no pulses the lower extremity. Mildly tender to palpation. RLQ wnl.   Vascular: 2+ radial, DP/PT pulses B/L  Neurological: AAOx3; no focal deficits    MEDICATIONS:  MEDICATIONS  (STANDING):  heparin  Infusion. 1100 Unit(s)/Hr (11 mL/Hr) IV Continuous <Continuous>  lactated ringers. 1000 milliLiter(s) (75 mL/Hr) IV Continuous <Continuous>  levothyroxine Injectable 40 MICROGram(s) IV Push <User Schedule>  levothyroxine Injectable 60 MICROGram(s) IV Push <User Schedule>  pantoprazole  Injectable 40 milliGRAM(s) IV Push daily  piperacillin/tazobactam IVPB.. 3.375 Gram(s) IV Intermittent every 12 hours  polyethylene glycol 3350 17 Gram(s) Oral daily  senna 2 Tablet(s) Oral at bedtime  sodium chloride 0.45%. 1000 milliLiter(s) (75 mL/Hr) IV Continuous <Continuous>    MEDICATIONS  (PRN):  heparin   Injectable 4000 Unit(s) IV Push every 6 hours PRN For aPTT less than 40  heparin   Injectable 2000 Unit(s) IV Push every 6 hours PRN For aPTT between 40 - 57  HYDROmorphone  Injectable 0.25 milliGRAM(s) IV Push every 4 hours PRN Moderate Pain (4 - 6)  HYDROmorphone  Injectable 0.5 milliGRAM(s) IV Push every 4 hours PRN Severe Pain (7 - 10)  ondansetron Injectable 4 milliGRAM(s) IV Push every 8 hours PRN Nausea and/or Vomiting      ALLERGIES:  Allergies    No Known Allergies    Intolerances        LABS:                          Urinalysis Basic - ( 2022 00:06 )    Color: Light Orange / Appearance: Slightly Turbid / S.018 / pH: x  Gluc: x / Ketone: Negative  / Bili: Negative / Urobili: <2 mg/dL   Blood: x / Protein: 100 mg/dL / Nitrite: Positive   Leuk Esterase: Large / RBC: 11 /HPF /  /HPF   Sq Epi: x / Non Sq Epi: 1 /HPF / Bacteria: Few      CAPILLARY BLOOD GLUCOSE          RADIOLOGY & ADDITIONAL TESTS: Reviewed.    ASSESSMENT:    PLAN: 
Cardiovascular Disease Progress Note    Overnight events: No acute events overnight.  no cp/sob/palps/dizziness  Otherwise review of systems negative    Objective Findings:  T(C): 36.2 (04-05-22 @ 20:15), Max: 36.3 (04-05-22 @ 13:29)  HR: 97 (04-05-22 @ 20:15) (95 - 97)  BP: 139/86 (04-05-22 @ 20:15) (139/86 - 154/66)  RR: 18 (04-05-22 @ 20:15) (18 - 18)  SpO2: 95% (04-05-22 @ 20:15) (93% - 95%)  Wt(kg): --  Daily     Daily       Physical Exam:  Gen: NAD  HEENT: EOMI  CV: RRR, normal S1 + S2, no m/r/g  Lungs: CTAB  Abd: soft, non-tender  Ext: No edema    Telemetry:    Laboratory Data:                        10.0   22.00 )-----------( 695      ( 05 Apr 2022 08:14 )             30.8     04-05    144  |  107  |  12  ----------------------------<  103<H>  3.2<L>   |  25  |  0.66    Ca    7.1<L>      05 Apr 2022 08:14  Phos  2.6     04-05  Mg     2.00     04-05    TPro  x   /  Alb  2.3<L>  /  TBili  x   /  DBili  x   /  AST  x   /  ALT  x   /  AlkPhos  x   04-05    PTT - ( 05 Apr 2022 08:14 )  PTT:32.4 sec          Inpatient Medications:  MEDICATIONS  (STANDING):  lactated ringers. 1000 milliLiter(s) (50 mL/Hr) IV Continuous <Continuous>  pantoprazole  Injectable 40 milliGRAM(s) IV Push daily      Assessment:  acute limb ischemia  UTI  encephalopathy  EMPERATRIZ  HLD  dementia  hypothyroid    Recs:  cardiac status stable  no e/o chf or cardiac ischemia  IV fluids to maintain euvolemic  abx per primary team  f/u vasc recs. likely conservative management for now  palliative evaluation. pending home hospice  arevalo in place for bladder decompression  pain control  f/u pal care recs  dvt ppx        Over 25 minutes spent on total encounter; more than 50% of the visit was spent counseling and/or coordinating care by the attending physician.      Fabiano Joyce MD   Cardiovascular Disease  [phone]
Cardiovascular Disease Progress Note    Overnight events: No acute events overnight.  no new cardiac sx  Otherwise review of systems negative    Objective Findings:  T(C): 36.4 (04-04-22 @ 05:08), Max: 36.9 (04-03-22 @ 14:26)  HR: 84 (04-04-22 @ 05:08) (84 - 92)  BP: 123/60 (04-04-22 @ 05:08) (116/72 - 123/60)  RR: 18 (04-04-22 @ 05:08) (18 - 18)  SpO2: 94% (04-04-22 @ 05:08) (94% - 95%)  Wt(kg): --  Daily     Daily       Physical Exam:  Gen: NAD  HEENT: EOMI  CV: RRR, normal S1 + S2, 2/6 sanchez  Lungs: CTAB  Abd: soft, non-tender  Ext: No edema    Telemetry:    Laboratory Data:                        9.7    18.72 )-----------( 487      ( 04 Apr 2022 04:36 )             30.6     04-04    147<H>  |  112<H>  |  15  ----------------------------<  107<H>  4.1   |  24  |  0.77    Ca    7.8<L>      04 Apr 2022 04:36  Phos  1.6     04-04  Mg     2.10     04-04      PT/INR - ( 04 Apr 2022 04:36 )   PT: 16.2 sec;   INR: 1.39 ratio         PTT - ( 04 Apr 2022 04:36 )  PTT:54.2 sec  CARDIAC MARKERS ( 04 Apr 2022 04:36 )  x     / x     / 3914 U/L / x     / x      CARDIAC MARKERS ( 03 Apr 2022 06:42 )  x     / x     / 4425 U/L / x     / x      CARDIAC MARKERS ( 02 Apr 2022 15:16 )  x     / x     / 4571 U/L / x     / x              Inpatient Medications:  MEDICATIONS  (STANDING):  heparin  Infusion. 1100 Unit(s)/Hr (11 mL/Hr) IV Continuous <Continuous>  lactated ringers. 1000 milliLiter(s) (75 mL/Hr) IV Continuous <Continuous>  levothyroxine Injectable 40 MICROGram(s) IV Push <User Schedule>  levothyroxine Injectable 60 MICROGram(s) IV Push <User Schedule>  pantoprazole  Injectable 40 milliGRAM(s) IV Push daily  piperacillin/tazobactam IVPB.. 3.375 Gram(s) IV Intermittent every 12 hours  polyethylene glycol 3350 17 Gram(s) Oral daily  potassium phosphate / sodium phosphate Powder (PHOS-NaK) 1 Packet(s) Oral once  senna 2 Tablet(s) Oral at bedtime  sodium chloride 0.45%. 1000 milliLiter(s) (75 mL/Hr) IV Continuous <Continuous>      Assessment:  acute limb ischemia  UTI  encephalopathy  EMPERATRIZ  HLD  dementia  hypothyroid    Recs:  cardiac status stable  no e/o chf or cardiac ischemia  IV fluids to maintain euvolemic  abx per primary team  f/u vasc recs. likely conservative management for now  palliative evaluation  arevalo in place for bladder decompression  pain control  speech and swallow eval  dvt ppx            Over 25 minutes spent on total encounter; more than 50% of the visit was spent counseling and/or coordinating care by the attending physician.      Fabiano Joyce MD   Cardiovascular Disease  [phone]
Cardiovascular Disease Progress Note    Overnight events: No acute events overnight.  worsening leukocytosis and tachypnea. unable to obtain ros      Objective Findings:  T(C): 36.7 (04-07-22 @ 05:32), Max: 36.7 (04-06-22 @ 12:25)  HR: 88 (04-07-22 @ 05:32) (88 - 95)  BP: 142/90 (04-07-22 @ 05:32) (137/89 - 148/91)  RR: 18 (04-07-22 @ 05:32) (18 - 22)  SpO2: 99% (04-07-22 @ 05:32) (90% - 99%)  Wt(kg): --  Daily     Daily       Physical Exam:  Gen: NAD  HEENT: EOMI  CV: RRR, normal S1 + S2, 2/6 sanchez  Lungs: CTAB  Abd: soft, non-tender  Ext: No edema    Telemetry:    Laboratory Data:                        10.0   22.00 )-----------( 695      ( 05 Apr 2022 08:14 )             30.8     04-05    144  |  107  |  12  ----------------------------<  103<H>  3.2<L>   |  25  |  0.66    Ca    7.1<L>      05 Apr 2022 08:14  Phos  2.6     04-05  Mg     2.00     04-05    TPro  x   /  Alb  2.3<L>  /  TBili  x   /  DBili  x   /  AST  x   /  ALT  x   /  AlkPhos  x   04-05    PTT - ( 05 Apr 2022 08:14 )  PTT:32.4 sec          Inpatient Medications:  MEDICATIONS  (STANDING):  pantoprazole  Injectable 40 milliGRAM(s) IV Push daily      Assessment:  acute limb ischemia  UTI  encephalopathy  EMPERATRIZ  HLD  dementia  hypothyroid    Recs:  cardiac status stable  worsening sob and leukocytosis. consider cxr, abx and trial of diuretics if within goc  f/u vasc recs. conservative management for now  palliative evaluation. pending home hospice  arevalo in place for bladder decompression  pain control  f/u pal care recs  dvt ppx        Over 25 minutes spent on total encounter; more than 50% of the visit was spent counseling and/or coordinating care by the attending physician.      Fabiano Joyce MD   Cardiovascular Disease  [phone]
Hospitalist Progress Note  Dr. Kathryn Infante    OVERNIGHT EVENTS: ALLY    SUBJECTIVE / INTERVAL HPI: Patient seen and examined at bedside. Pt. with dementia A&Ox 0 and non-verbal. ROS not obtained.     Vital Signs Last 24 Hrs  T(C): 36.5 (2022 14:00), Max: 36.5 (2022 14:00)  T(F): 97.7 (2022 14:00), Max: 97.7 (2022 14:00)  HR: 90 (2022 14:00) (84 - 90)  BP: 125/64 (2022 14:00) (116/72 - 125/64)  BP(mean): --  RR: 17 (2022 14:00) (17 - 18)  SpO2: 96% (2022 14:00) (94% - 96%)    PHYSICAL EXAM:    General: Elderly female - resting in bed - calm  HEENT: NC/AT; PERRL, anicteric sclera; MMM  Neck: supple  Cardiovascular: +S1/S2; RRR  Respiratory: CTA B/L; no W/R/R  Gastrointestinal: soft, NT/ND; +BSx4  Extremities: Left lower extremity cold and blue appearing with no pulses the lower extremity. Mildly tender to palpation. RLQ wnl.   Vascular: 2+ radial, DP/PT pulses B/L  Neurological: AAOx3; no focal deficits    MEDICATIONS:  MEDICATIONS  (STANDING):  heparin  Infusion. 1100 Unit(s)/Hr (11 mL/Hr) IV Continuous <Continuous>  lactated ringers. 1000 milliLiter(s) (75 mL/Hr) IV Continuous <Continuous>  levothyroxine Injectable 40 MICROGram(s) IV Push <User Schedule>  levothyroxine Injectable 60 MICROGram(s) IV Push <User Schedule>  pantoprazole  Injectable 40 milliGRAM(s) IV Push daily  piperacillin/tazobactam IVPB.. 3.375 Gram(s) IV Intermittent every 12 hours  polyethylene glycol 3350 17 Gram(s) Oral daily  senna 2 Tablet(s) Oral at bedtime  sodium chloride 0.45%. 1000 milliLiter(s) (75 mL/Hr) IV Continuous <Continuous>    MEDICATIONS  (PRN):  heparin   Injectable 4000 Unit(s) IV Push every 6 hours PRN For aPTT less than 40  heparin   Injectable 2000 Unit(s) IV Push every 6 hours PRN For aPTT between 40 - 57  HYDROmorphone  Injectable 0.25 milliGRAM(s) IV Push every 4 hours PRN Moderate Pain (4 - 6)  HYDROmorphone  Injectable 0.5 milliGRAM(s) IV Push every 4 hours PRN Severe Pain (7 - 10)  ondansetron Injectable 4 milliGRAM(s) IV Push every 8 hours PRN Nausea and/or Vomiting      ALLERGIES:  Allergies    No Known Allergies    Intolerances        LABS:                                   9.7    18.72 )-----------( 487      ( 2022 04:36 )             30.6   04-04    147<H>  |  112<H>  |  15  ----------------------------<  107<H>  4.1   |  24  |  0.77    Ca    7.8<L>      2022 04:36  Phos  1.6     04-04  Mg     2.10     04-04        Urinalysis Basic - ( 2022 00:06 )    Color: Light Orange / Appearance: Slightly Turbid / S.018 / pH: x  Gluc: x / Ketone: Negative  / Bili: Negative / Urobili: <2 mg/dL   Blood: x / Protein: 100 mg/dL / Nitrite: Positive   Leuk Esterase: Large / RBC: 11 /HPF /  /HPF   Sq Epi: x / Non Sq Epi: 1 /HPF / Bacteria: Few      CAPILLARY BLOOD GLUCOSE          RADIOLOGY & ADDITIONAL TESTS: Reviewed.    ASSESSMENT:    PLAN: 
SUBJECTIVE AND OBJECTIVE:  Pt more restless today    INTERVAL HPI/OVERNIGHT EVENTS:  Family meeting today - goal is comfort, hospice referral, see GOC note    DNR on chart: yes  Allergies    No Known Allergies    Intolerances    MEDICATIONS  (STANDING):  lactated ringers. 1000 milliLiter(s) (50 mL/Hr) IV Continuous <Continuous>  pantoprazole  Injectable 40 milliGRAM(s) IV Push daily    MEDICATIONS  (PRN):  LORazepam     Tablet 0.5 milliGRAM(s) Oral every 6 hours PRN Agitation  morphine  - Injectable 1 milliGRAM(s) IV Push every 2 hours PRN Severe Pain (7 - 10) / discomfort  morphine Concentrate 2.5 milliGRAM(s) SubLingual every 3 hours PRN Moderate Pain (4 - 6)  ondansetron Injectable 4 milliGRAM(s) IV Push every 8 hours PRN Nausea and/or Vomiting    ITEMS UNCHECKED ARE NOT PRESENT    PRESENT SYMPTOMS: [x ]Unable to obtain due to poor mentation   Source if other than patient:  [ ]Family   [ ]Team     Pain (Impact on QOL):    Location:  Minimal acceptable level (0-10 scale):                   Aggravating factors:  Quality:  Radiation:  Severity (0-10 scale):    Timing:    Dyspnea:                           [ ]Mild [ ]Moderate [ ]Severe  Anxiety:                             [ ]Mild [ ]Moderate [ ]Severe  Fatigue:                             [ ]Mild [ ]Moderate [ ]Severe  Nausea:                             [ ]Mild [ ]Moderate [ ]Severe  Loss of appetite:              [ ]Mild [ ]Moderate [ ]Severe  Constipation:                    [ ]Mild [ ]Moderate [ ]Severe    PAIN AD Score:	2  [email or web address] (Ctrl + left click to view)    Other Symptoms:  [ ]All other review of systems negative     Karnofsky Performance Score/Palliative Performance Status Version 2: 30 %    http://palliative.info/resource_material/[email or web address].pdf    PHYSICAL EXAM:  Vital Signs Last 24 Hrs  T(C): 36.3 (05 Apr 2022 13:29), Max: 36.8 (05 Apr 2022 06:10)  T(F): 97.4 (05 Apr 2022 13:29), Max: 98.2 (05 Apr 2022 06:10)  HR: 95 (05 Apr 2022 13:29) (95 - 98)  BP: 154/66 (05 Apr 2022 13:29) (113/77 - 154/66)  BP(mean): --  RR: 18 (05 Apr 2022 13:29) (17 - 18)  SpO2: 93% (05 Apr 2022 13:29) (91% - 98%) I&O's Summary    GENERAL:  [ ]Alert  [ ]Oriented x   [ ]Lethargic  [ ]Cachexia  [ ]Unarousable  [ ]Verbal  [x ]Non-Verbal  Behavioral:   [ ] Anxiety  [ ] Delirium [x ] Agitation [ ] Other  HEENT:  [ ]Normal   [ x]Dry mouth   [ ]ET Tube/Trach  [ ]Oral lesions  PULMONARY:   [ x]Clear [ ]Tachypnea  [ ]Audible excessive secretions   [ ]Rhonchi        [ ]Right [ ]Left [ ]Bilateral  [ ]Crackles        [ ]Right [ ]Left [ ]Bilateral  [ ]Wheezing     [ ]Right [ ]Left [ ]Bilateral  CARDIOVASCULAR:    [x ]Regular [ ]Irregular [ ]Tachy  [ ]Celio [ ]Murmur [ ]Other  GASTROINTESTINAL:  [ x]Soft  [ ]Distended   [x ]+BS  x[ ]Non tender [ ]Tender  [ ]PEG [ ]OGT/ NGT   Last BM:  4/3  GENITOURINARY:  [ ]Normal [ ] Incontinent   [ ]Oliguria/Anuria   [ x]Ga  MUSCULOSKELETAL:   [ ]Normal   [ ]Weakness  [x ]Bed/Wheelchair bound [ ]Edema  NEUROLOGIC:   [ ]No focal deficits  [x ] Cognitive impairment  [ ] Dysphagia [ ]Dysarthria [ ] Paresis [ ]Other   SKIN: ischemic limb  [ ]Normal   [ ]Pressure ulcer(s)  [ ]Rash    CRITICAL CARE:  [ ] Shock Present  [ ]Septic [ ]Cardiogenic [ ]Neurologic [ ]Hypovolemic  [ ]  Vasopressors [ ]  Inotropes   [ ] Respiratory failure present  [ ] Acute  [ ] Chronic [ ] Hypoxic  [ ] Hypercarbic [ ] Other  [ ] Other organ failure     GRIEF   [ x] Yes  [ ] No    LABS:                        10.0   22.00 )-----------( 695      ( 05 Apr 2022 08:14 )             30.8   04-05    144  |  107  |  12  ----------------------------<  103<H>  3.2<L>   |  25  |  0.66    Ca    7.1<L>      05 Apr 2022 08:14  Phos  2.6     04-05  Mg     2.00     04-05    TPro  x   /  Alb  2.3<L>  /  TBili  x   /  DBili  x   /  AST  x   /  ALT  x   /  AlkPhos  x   04-05  PT/INR - ( 04 Apr 2022 04:36 )   PT: 16.2 sec;   INR: 1.39 ratio       PTT - ( 05 Apr 2022 08:14 )  PTT:32.4 sec    RADIOLOGY & ADDITIONAL STUDIES: reviewed    Protein Calorie Malnutrition Present: [ ] yes [ ] no  [ ] PPSV2 < or = 30%  [ ] significant weight loss [ ] poor nutritional intake [ ] anasarca [ ] catabolic state Artificial Nutrition [ ]     REFERRALS:   [ ]Chaplaincy  [ ] Hospice  [ ]Child Life  [ ]Social Work  [ ]Case management [ ]Holistic Therapy   Goals of Care Document: 
Hospitalist Progress Note  Dr. Kathryn Infante    OVERNIGHT EVENTS: ALLY    SUBJECTIVE / INTERVAL HPI: Patient seen and examined at bedside. Pt. with dementia A&Ox 0 and non-verbal. ROS not obtained. Family meeting held with son, daughter and Pall care attending     Vital Signs Last 24 Hrs  T(C): 36.3 (2022 13:29), Max: 36.8 (2022 06:10)  T(F): 97.4 (2022 13:29), Max: 98.2 (2022 06:10)  HR: 95 (2022 13:29) (95 - 98)  BP: 154/66 (2022 13:29) (113/77 - 154/66)  BP(mean): --  RR: 18 (2022 13:29) (17 - 18)  SpO2: 93% (2022 13:29) (91% - 98%)    PHYSICAL EXAM:    General: Elderly female - resting in bed - calm  HEENT: NC/AT; PERRL, anicteric sclera; MMM  Neck: supple  Cardiovascular: +S1/S2; RRR  Respiratory: CTA B/L; no W/R/R  Gastrointestinal: soft, NT/ND; +BSx4  Extremities: Left lower extremity cold and blue appearing with no pulses the lower extremity. Mildly tender to palpation. RLQ wnl.   Vascular: 2+ radial, DP/PT pulses B/L  Neurological: AAOx3; no focal deficits    MEDICATIONS:  MEDICATIONS  (STANDING):  heparin  Infusion. 1100 Unit(s)/Hr (11 mL/Hr) IV Continuous <Continuous>  lactated ringers. 1000 milliLiter(s) (75 mL/Hr) IV Continuous <Continuous>  levothyroxine Injectable 40 MICROGram(s) IV Push <User Schedule>  levothyroxine Injectable 60 MICROGram(s) IV Push <User Schedule>  pantoprazole  Injectable 40 milliGRAM(s) IV Push daily  piperacillin/tazobactam IVPB.. 3.375 Gram(s) IV Intermittent every 12 hours  polyethylene glycol 3350 17 Gram(s) Oral daily  senna 2 Tablet(s) Oral at bedtime  sodium chloride 0.45%. 1000 milliLiter(s) (75 mL/Hr) IV Continuous <Continuous>    MEDICATIONS  (PRN):  heparin   Injectable 4000 Unit(s) IV Push every 6 hours PRN For aPTT less than 40  heparin   Injectable 2000 Unit(s) IV Push every 6 hours PRN For aPTT between 40 - 57  HYDROmorphone  Injectable 0.25 milliGRAM(s) IV Push every 4 hours PRN Moderate Pain (4 - 6)  HYDROmorphone  Injectable 0.5 milliGRAM(s) IV Push every 4 hours PRN Severe Pain (7 - 10)  ondansetron Injectable 4 milliGRAM(s) IV Push every 8 hours PRN Nausea and/or Vomiting      ALLERGIES:  Allergies    No Known Allergies    Intolerances        LABS:                                   10.0   22.00 )-----------( 695      ( 2022 08:14 )             30.8   04-05    144  |  107  |  12  ----------------------------<  103<H>  3.2<L>   |  25  |  0.66    Ca    7.1<L>      2022 08:14  Phos  2.6     04-05  Mg     2.00     04-05    TPro  x   /  Alb  2.3<L>  /  TBili  x   /  DBili  x   /  AST  x   /  ALT  x   /  AlkPhos  x   04-05          Urinalysis Basic - ( 2022 00:06 )    Color: Light Orange / Appearance: Slightly Turbid / S.018 / pH: x  Gluc: x / Ketone: Negative  / Bili: Negative / Urobili: <2 mg/dL   Blood: x / Protein: 100 mg/dL / Nitrite: Positive   Leuk Esterase: Large / RBC: 11 /HPF /  /HPF   Sq Epi: x / Non Sq Epi: 1 /HPF / Bacteria: Few      CAPILLARY BLOOD GLUCOSE          RADIOLOGY & ADDITIONAL TESTS: Reviewed.    ASSESSMENT:    PLAN: 
Hospitalist Progress Note  Devorah Norris MD Pager # [phone]    OVERNIGHT EVENTS: ALLY    SUBJECTIVE / INTERVAL HPI: Patient seen and examined at bedside. Pt. with dementia A&Ox 0 and non-verbal. ROS not obtained.     VITAL SIGNS:  Vital Signs Last 24 Hrs  T(C): 36.4 (2022 05:06), Max: 36.7 (2022 15:08)  T(F): 97.5 (2022 05:06), Max: 98.1 (2022 15:08)  HR: 78 (2022 05:06) (78 - 87)  BP: 115/79 (2022 05:06) (105/64 - 115/79)  BP(mean): --  RR: 18 (2022 05:06) (17 - 18)  SpO2: 96% (2022 05:06) (94% - 97%)    PHYSICAL EXAM:    General: Elderly female - resting in bed - calm  HEENT: NC/AT; PERRL, anicteric sclera; MMM  Neck: supple  Cardiovascular: +S1/S2; RRR  Respiratory: CTA B/L; no W/R/R  Gastrointestinal: soft, NT/ND; +BSx4  Extremities: Left lower extremity cold and blue appearing with no pulses the lower extremity. Mildly tender to palpation. RLQ wnl.   Vascular: 2+ radial, DP/PT pulses B/L  Neurological: AAOx3; no focal deficits    MEDICATIONS:  MEDICATIONS  (STANDING):  heparin  Infusion. 1100 Unit(s)/Hr (11 mL/Hr) IV Continuous <Continuous>  lactated ringers. 1000 milliLiter(s) (75 mL/Hr) IV Continuous <Continuous>  levothyroxine Injectable 40 MICROGram(s) IV Push <User Schedule>  levothyroxine Injectable 60 MICROGram(s) IV Push <User Schedule>  pantoprazole  Injectable 40 milliGRAM(s) IV Push daily  piperacillin/tazobactam IVPB.. 3.375 Gram(s) IV Intermittent every 12 hours  polyethylene glycol 3350 17 Gram(s) Oral daily  senna 2 Tablet(s) Oral at bedtime  sodium chloride 0.45%. 1000 milliLiter(s) (75 mL/Hr) IV Continuous <Continuous>    MEDICATIONS  (PRN):  heparin   Injectable 4000 Unit(s) IV Push every 6 hours PRN For aPTT less than 40  heparin   Injectable 2000 Unit(s) IV Push every 6 hours PRN For aPTT between 40 - 57  HYDROmorphone  Injectable 0.25 milliGRAM(s) IV Push every 4 hours PRN Moderate Pain (4 - 6)  HYDROmorphone  Injectable 0.5 milliGRAM(s) IV Push every 4 hours PRN Severe Pain (7 - 10)  ondansetron Injectable 4 milliGRAM(s) IV Push every 8 hours PRN Nausea and/or Vomiting      ALLERGIES:  Allergies    No Known Allergies    Intolerances        LABS:                        10.1   17.30 )-----------( 344      ( 2022 06:42 )             31.5     04-03    145  |  110<H>  |  21  ----------------------------<  104<H>  3.7   |  23  |  0.86    Ca    7.3<L>      2022 06:42  Phos  2.9     04-03  Mg     2.20     04-03      PT/INR - ( 2022 06:42 )   PT: 15.1 sec;   INR: 1.30 ratio         PTT - ( 2022 06:42 )  PTT:56.7 sec  Urinalysis Basic - ( 2022 00:06 )    Color: Light Orange / Appearance: Slightly Turbid / S.018 / pH: x  Gluc: x / Ketone: Negative  / Bili: Negative / Urobili: <2 mg/dL   Blood: x / Protein: 100 mg/dL / Nitrite: Positive   Leuk Esterase: Large / RBC: 11 /HPF /  /HPF   Sq Epi: x / Non Sq Epi: 1 /HPF / Bacteria: Few      CAPILLARY BLOOD GLUCOSE          RADIOLOGY & ADDITIONAL TESTS: Reviewed.    ASSESSMENT:    PLAN: 
Hospitalist Progress Note  Devorah Norris MD Pager # [phone]    OVERNIGHT EVENTS: ALLY    SUBJECTIVE / INTERVAL HPI: Patient seen and examined at bedside. Pt. with dementia and unable to appropriately respond to ROS questions. Not obtained.     VITAL SIGNS:  Vital Signs Last 24 Hrs  T(C): 36.5 (2022 05:05), Max: 36.5 (2022 05:05)  T(F): 97.7 (2022 05:05), Max: 97.7 (2022 05:05)  HR: 92 (2022 05:05) (90 - 95)  BP: 117/66 (2022 05:05) (105/71 - 117/66)  BP(mean): --  RR: 18 (2022 05:05) (16 - 18)  SpO2: 98% (2022 05:05) (95% - 98%)    PHYSICAL EXAM:    General: Elderly female resting in bed   HEENT: NC/AT; PERRL, anicteric sclera; MMM  Neck: supple  Cardiovascular: +S1/S2; RRR  Respiratory: CTA B/L; no W/R/R  Gastrointestinal: soft, NT/ND; +BSx4  Extremities: WWP; no edema, clubbing or cyanosis. Left foot mottled and cold - purplish color.   Vascular: 2+ radial, DP/PT pulses B/L  Neurological: AAOx3; no focal deficits    MEDICATIONS:  MEDICATIONS  (STANDING):  heparin  Infusion. 1100 Unit(s)/Hr (11 mL/Hr) IV Continuous <Continuous>  levothyroxine Injectable 40 MICROGram(s) IV Push <User Schedule>  levothyroxine Injectable 60 MICROGram(s) IV Push <User Schedule>  pantoprazole  Injectable 40 milliGRAM(s) IV Push daily  piperacillin/tazobactam IVPB.. 3.375 Gram(s) IV Intermittent every 12 hours  sodium chloride 0.9%. 1000 milliLiter(s) (100 mL/Hr) IV Continuous <Continuous>    MEDICATIONS  (PRN):  HYDROmorphone  Injectable 0.25 milliGRAM(s) IV Push every 4 hours PRN Moderate Pain (4 - 6)  HYDROmorphone  Injectable 0.5 milliGRAM(s) IV Push every 4 hours PRN Severe Pain (7 - 10)  ondansetron Injectable 4 milliGRAM(s) IV Push every 8 hours PRN Nausea and/or Vomiting      ALLERGIES:  Allergies    No Known Allergies    Intolerances        LABS:                        10.1   14.15 )-----------( 267      ( 2022 08:50 )             30.8     04-    145  |  108<H>  |  45<H>  ----------------------------<  124<H>  4.2   |  27  |  1.62<H>    Ca    8.1<L>      2022 23:49  Phos  3.3       Mg     2.60         TPro  6.8  /  Alb  2.0<L>  /  TBili  0.8  /  DBili  x   /  AST  122<H>  /  ALT  56  /  AlkPhos  227<H>  04    PT/INR - ( 2022 23:49 )   PT: 13.8 sec;   INR: 1.19 ratio         PTT - ( 2022 08:50 )  PTT:68.8 sec  Urinalysis Basic - ( 2022 00:06 )    Color: Light Orange / Appearance: Slightly Turbid / S.018 / pH: x  Gluc: x / Ketone: Negative  / Bili: Negative / Urobili: <2 mg/dL   Blood: x / Protein: 100 mg/dL / Nitrite: Positive   Leuk Esterase: Large / RBC: 11 /HPF /  /HPF   Sq Epi: x / Non Sq Epi: 1 /HPF / Bacteria: Few      CAPILLARY BLOOD GLUCOSE          RADIOLOGY & ADDITIONAL TESTS: Reviewed.    ASSESSMENT:    PLAN:

## 2022-04-07 NOTE — PROGRESS NOTE ADULT - PROBLEM SELECTOR PLAN 3
UA positive. Recently diagnosed with UTI.   -C/w zosyn for now  -urine culture normal jim
PPSV 30%  Needs assistance with all ADLs  Skin care  Frequent repositioning.
UA positive. Recently diagnosed with UTI.   -s/p zosyn for 3 days  -urine culture normal jim
UA positive. Recently diagnosed with UTI. Pending UCx.   -C/w zosyn for now  -F/u with culture
UA positive. Recently diagnosed with UTI.   -s/p zosyn for 3 days  -urine culture normal jim
UA positive. Recently diagnosed with UTI.   -s/p zosyn for 3 days  -urine culture normal jim
Patient was recent diagnosed with UTI  -C/w zosyn for now  -F/u with culture   -Possibly contributing to encephalopathy as well

## 2022-04-07 NOTE — PROGRESS NOTE ADULT - PROBLEM SELECTOR PLAN 5
CK elevated likely 2/2 limb ischemia. CK downtrending to ~4000 today.   - C/w LR   - Trend CK
CK elevated likely 2/2 limb ischemia. CK downtrending   - per GOC, no further blood work
CK elevated likely 2/2 limb ischemia. CK downtrending   - per GOC, no further blood work
Due to acute limb ischemia  Uptrending CK   -monitor CK q6h  -monitor bmp q6h  -IVF @100/h  -if worsening renal function in setting of rhabdo will need to consider renal consult if consistent with family wishes.
CK elevated likely 2/2 limb ischemia. CK downtrending to ~4000 today.   - C/w LR   - Trend CK
CK elevated likely 2/2 limb ischemia. CK downtrending   - per GOC, no further blood work

## 2022-04-07 NOTE — PROGRESS NOTE ADULT - PROBLEM SELECTOR PLAN 7
HOLD home statin. unable to take PO and family does not desire NGT
comfort focused care   no need to continue meds for HLD
HOLD home statin. unable to take PO and family does not desire NGT
HOLD home statin. unable to take PO and family does not desire NGT
comfort focused care   no need to continue meds for HLD
comfort focused care   no need to continue meds for HLD

## 2022-04-07 NOTE — PROGRESS NOTE ADULT - PROBLEM SELECTOR PLAN 11
-family meeting held with [facility] care attending Dr. Bright  -comfort focused care  -DNR/DNI - MOLST signed    Plan to dc home tomorrow with home hospice. Plan discussed with celio barraza today
-family meeting held with [facility] care attending Dr. Bright  -comfort focused care  -DNR/DNI - MOLST signed    Plan to dc home with hospice today. Time spent 40 mins
-family meeting held with [facility] care attending Dr. Bright  -comfort focused care  -DNR/DNI - MOLST signed  -Hospice referral  25 mins spent in advance care planning

## 2022-04-07 NOTE — PROGRESS NOTE ADULT - REASON FOR ADMISSION
LLE acute limb ischemia and UTI

## 2022-09-26 NOTE — ED ADULT TRIAGE NOTE - NS ED NURSE BANDS TYPE
I am okay with referral to PT per patient request, but make sure Dr. Cabrera is also aware.    Name band;

## 2023-01-27 NOTE — ED PROVIDER NOTE - CONDUCTED A DETAILED DISCUSSION WITH PATIENT AND/OR GUARDIAN REGARDING, MDM
PT keeps forgetting to take her afternoon dose of neurontin so will change to whole 600 mg twice daily instead of half a m  half afternoon and full evening  Explained to the patient that this should really help with all of her aches and pains that are due to her age this diagnosis and spinal stenosis  radiology results/lab results

## 2024-12-03 NOTE — CONSULT NOTE ADULT - PROBLEM SELECTOR RECOMMENDATION 2
FAST>7C  Pt already had [facility] for her care, dependent on most ADLs  Currently with significant decline  Hospice appropriate  Pending further GOC conversations with family
Consult- Nutrition Services Assessment/Education